# Patient Record
Sex: FEMALE
[De-identification: names, ages, dates, MRNs, and addresses within clinical notes are randomized per-mention and may not be internally consistent; named-entity substitution may affect disease eponyms.]

---

## 2017-03-13 ENCOUNTER — HOSPITAL ENCOUNTER (INPATIENT)
Dept: HOSPITAL 14 - H.ER | Age: 77
LOS: 4 days | Discharge: HOME | DRG: 194 | End: 2017-03-17
Attending: HOSPITALIST | Admitting: HOSPITALIST
Payer: MEDICARE

## 2017-03-13 VITALS — BODY MASS INDEX: 22.8 KG/M2

## 2017-03-13 DIAGNOSIS — E86.9: ICD-10-CM

## 2017-03-13 DIAGNOSIS — I10: ICD-10-CM

## 2017-03-13 DIAGNOSIS — E78.00: ICD-10-CM

## 2017-03-13 DIAGNOSIS — M21.372: ICD-10-CM

## 2017-03-13 DIAGNOSIS — H66.92: ICD-10-CM

## 2017-03-13 DIAGNOSIS — E78.5: ICD-10-CM

## 2017-03-13 DIAGNOSIS — E87.2: ICD-10-CM

## 2017-03-13 DIAGNOSIS — M34.1: ICD-10-CM

## 2017-03-13 DIAGNOSIS — J01.90: ICD-10-CM

## 2017-03-13 DIAGNOSIS — E87.1: ICD-10-CM

## 2017-03-13 DIAGNOSIS — I50.9: ICD-10-CM

## 2017-03-13 DIAGNOSIS — J18.9: Primary | ICD-10-CM

## 2017-03-13 DIAGNOSIS — D64.89: ICD-10-CM

## 2017-03-13 DIAGNOSIS — H40.9: ICD-10-CM

## 2017-03-13 DIAGNOSIS — H91.90: ICD-10-CM

## 2017-03-13 DIAGNOSIS — E87.6: ICD-10-CM

## 2017-03-13 DIAGNOSIS — J84.9: ICD-10-CM

## 2017-03-13 LAB
BASOPHILS # BLD AUTO: 0.1 K/UL (ref 0–0.2)
BASOPHILS NFR BLD: 0.3 % (ref 0–2)
EOSINOPHIL # BLD AUTO: 0 K/UL (ref 0–0.7)
EOSINOPHIL NFR BLD: 0 % (ref 0–4)
ERYTHROCYTE [DISTWIDTH] IN BLOOD BY AUTOMATED COUNT: 13.8 % (ref 11.5–14.5)
HCT VFR BLD CALC: 38 % (ref 34–47)
LYMPHOCYTES # BLD AUTO: 2.8 K/UL (ref 1–4.3)
LYMPHOCYTES NFR BLD AUTO: 10 % (ref 20–40)
MCH RBC QN AUTO: 31 PG (ref 27–31)
MCHC RBC AUTO-ENTMCNC: 32.2 G/DL (ref 33–37)
MCV RBC AUTO: 96.3 FL (ref 81–99)
MONOCYTES # BLD: 1.9 K/UL (ref 0–0.8)
MONOCYTES NFR BLD: 6.7 % (ref 0–10)
NEUTROPHILS # BLD: 23.5 K/UL (ref 1.8–7)
NEUTROPHILS NFR BLD AUTO: 83 % (ref 50–75)
NRBC BLD AUTO-RTO: 0.1 % (ref 0–0)
PLATELET # BLD: 199 K/UL (ref 130–400)
PMV BLD AUTO: 9.1 FL (ref 7.2–11.7)
WBC # BLD AUTO: 28.3 K/UL (ref 4.8–10.8)

## 2017-03-13 NOTE — ED PDOC
HPI: General Adult


Time Seen by Provider: 03/13/17 21:06


Chief Complaint (Nursing): Flu-like Symptoms


Chief Complaint (Provider): Fever, fall


History Per: Patient, Family (son)


Additional Complaint(s): 


Pt. presents with son and states for the past week she's had fever, bodyaches, 

cough. States that 2 days ago while taking a shower she slipped on soap and 

landed on her R side injuring her R hip and R flank area. Pt. was able to get 

up on her own immediately. Denies hematuria, head injury, sick contacts, recent 

travel, N/V/D, melena, hematochezia, BRBPR, neck pain.





Past Medical History


Reviewed: Historical Data, Nursing Documentation, Vital Signs


Vital Signs: 


 Last Vital Signs











Temp  97.4 F L  03/14/17 03:30


 


Pulse  82   03/14/17 03:30


 


Resp  19   03/14/17 03:30


 


BP  94/55 L  03/14/17 03:30


 


Pulse Ox  99   03/13/17 22:02














- Medical History


PMH: Bronchitis, CHF, Gall Bladder Disease (Cholelithiasis), HTN, 

Hypercholesterolemia, Pneumonia


   Denies: Diabetes, HIV, Chronic Kidney Disease





- Family History


Family History: States: No Known Family Hx





- Home Medications


Home Medications: 


 Ambulatory Orders











 Medication  Instructions  Recorded


 


Pentoxifylline [Pentoxil] 400 mg PO TID #0 ter 09/28/15


 


Tramadol Hydrochloride [Tramadol] 50 mg PO DAILY #0 tab 09/28/15


 


traMADol [Ultram] 50 mg PO TID PRN #12 tab 02/02/16














- Allergies


Allergies/Adverse Reactions: 


 Allergies











Allergy/AdvReac Type Severity Reaction Status Date / Time


 


cefadroxil Allergy  RASH Verified 03/13/17 20:40














Review of Systems


ROS Statement: Except As Marked, All Systems Reviewed And Found Negative


Constitutional: Positive for: Fever


Respiratory: Positive for: Cough


Gastrointestinal: Positive for: Abdominal Pain





Physical Exam





- Reviewed


Nursing Documentation Reviewed: Yes


Vital Signs Reviewed: Yes





- Physical Exam


Appears: Positive for: Well, Non-toxic, No Acute Distress


Head Exam: Positive for: ATRAUMATIC, NORMAL INSPECTION, NORMOCEPHALIC


Skin: Positive for: Normal Color, Warm.  Negative for: Rash


Eye Exam: Positive for: EOMI, Normal appearance, PERRL


ENT: Positive for: Normal ENT Inspection


Neck: Positive for: Normal, Painless ROM


Cardiovascular/Chest: Positive for: Regular Rate, Rhythm


Respiratory: Positive for: Normal Breath Sounds.  Negative for: Crackles, Rales


Gastrointestinal/Abdominal: Positive for: Normal Exam, Bowel Sounds, Soft, 

Tenderness (RLQ abdominal tenderness)


Back: Positive for: Normal Inspection, R CVA Tenderness (with large ecchymotic 

area)


Extremity: Positive for: Normal ROM, Other (R lateral hip tenderness without 

pelvic tenderness or deformity).  Negative for: Calf Tenderness


Neurologic/Psych: Positive for: Alert, Oriented





- Laboratory Results


Result Diagrams: 


 03/13/17 21:40





 03/14/17 01:55





- ECG


O2 Sat by Pulse Oximetry: 99





- Radiology


X-Ray: Interpreted by Me (CXR)


X-Ray Interpretation: No Acute Disease (RML infiltrate)





- Progress


ED Course And Treament: 


Levaquin 500mg IV given for pneumonia.


Lactate: 1.8.


Chemisty hemolyzed multiple times therefore CT was done without IV contrast.


CT abd/pelvis w/ PO contrast: MPRESSION:


1. Probable multifocal pneumonia. Followup to resolution to exclude underlying 

pathology.


2. Suspect sacral fracture. Correlate clinically for pain in region.


3. Cholelithiasis





R hip/pelvic x-ray: no fx





IV NS bolus given. 


Case d/w Dr. Hall and informed of lack of IV contrast with CT scan. Arrangements 

made for admission. 





ED OBSERVATION


Date of observation admission: 03/13/17


Time of observation admission: 22:02





- Observation admission statement


Patient is being placed in observation because:: 


Abdominal pain, fall, fever





- Progress Note


Progress Note: 





03/13/17 22:02


Labs ordered. CT abd/pelvis, CT head, CXR, R hip x-ray ordered. 





Disposition





- Clinical Impression


Clinical Impression: 


 Pneumonia, Traumatic ecchymosis of flank





- Patient ED Disposition


Is Patient to be Admitted: Yes





- Disposition


Disposition Time: 02:17


Condition: STABLE

## 2017-03-14 LAB
ALBUMIN/GLOB SERPL: 0.7 {RATIO} (ref 1–2.1)
ALP SERPL-CCNC: 745 U/L (ref 38–126)
ALT SERPL-CCNC: 45 U/L (ref 9–52)
ARTERIAL  BLOOD GAS MODE: (no result)
ARTERIAL PATENCY WRIST A: YES
AST SERPL-CCNC: 58 U/L (ref 14–36)
BASE EXCESS BLDV CALC-SCNC: -13.3 MMOL/L (ref 0–2)
BASOPHILS # BLD AUTO: 0 K/UL (ref 0–0.2)
BASOPHILS NFR BLD: 0.2 % (ref 0–2)
BASOPHILS NFR BLD: 1 % (ref 0–2)
BILIRUB SERPL-MCNC: 5.1 MG/DL (ref 0.2–1.3)
BUN SERPL-MCNC: 21 MG/DL (ref 7–17)
BUN SERPL-MCNC: 24 MG/DL (ref 7–17)
CALCIUM SERPL-MCNC: 7.9 MG/DL (ref 8.4–10.2)
CALCIUM SERPL-MCNC: 7.9 MG/DL (ref 8.4–10.2)
CHLORIDE SERPL-SCNC: 100 MMOL/L (ref 98–107)
CHLORIDE SERPL-SCNC: 99 MMOL/L (ref 98–107)
CO2 SERPL-SCNC: 16 MMOL/L (ref 22–30)
CO2 SERPL-SCNC: 18 MMOL/L (ref 22–30)
EOSINOPHIL # BLD AUTO: 0 K/UL (ref 0–0.7)
EOSINOPHIL NFR BLD: 0 % (ref 0–4)
ERYTHROCYTE [DISTWIDTH] IN BLOOD BY AUTOMATED COUNT: 13.3 % (ref 11.5–14.5)
GLOBULIN SER-MCNC: 3.9 GM/DL (ref 2.2–3.9)
GLUCOSE SERPL-MCNC: 103 MG/DL (ref 65–105)
GLUCOSE SERPL-MCNC: 120 MG/DL (ref 65–105)
HCO3 BLDA-SCNC: 23.8 MMOL/L (ref 21–28)
HCT VFR BLD CALC: 30.1 % (ref 34–47)
LYMPHOCYTES # BLD AUTO: 2.3 K/UL (ref 1–4.3)
LYMPHOCYTES NFR BLD AUTO: 9.2 % (ref 20–40)
MCH RBC QN AUTO: 31.6 PG (ref 27–31)
MCHC RBC AUTO-ENTMCNC: 34 G/DL (ref 33–37)
MCV RBC AUTO: 93.2 FL (ref 81–99)
MONOCYTES # BLD: 1.8 K/UL (ref 0–0.8)
MONOCYTES NFR BLD: 7.4 % (ref 0–10)
NEUTROPHILS # BLD: 20.3 K/UL (ref 1.8–7)
NEUTROPHILS NFR BLD AUTO: 75 % (ref 42–75)
NEUTROPHILS NFR BLD AUTO: 83.2 % (ref 50–75)
NRBC BLD AUTO-RTO: 0 % (ref 0–0)
PCO2 BLDV: 62 MMHG (ref 40–60)
PH BLDA: 7.49 [PH] (ref 7.35–7.45)
PH BLDV: 7.06 [PH] (ref 7.32–7.43)
PLATELET # BLD: 170 K/UL (ref 130–400)
PMV BLD AUTO: 9.4 FL (ref 7.2–11.7)
PO2 BLDA: 86 MM/HG (ref 80–100)
POTASSIUM SERPL-SCNC: 3.3 MMOL/L (ref 3.6–5)
POTASSIUM SERPL-SCNC: 3.4 MMOL/L (ref 3.6–5)
PROT SERPL-MCNC: 6.8 G/DL (ref 6.3–8.2)
SODIUM SERPL-SCNC: 125 MMOL/L (ref 132–148)
SODIUM SERPL-SCNC: 126 MMOL/L (ref 132–148)
TOTAL CELLS COUNTED BLD: 100
VARIANT LYMPHS NFR BLD MANUAL: 4 % (ref 0–0)
VENOUS  BLOOD GAS MODE: (no result)
WBC # BLD AUTO: 24.5 K/UL (ref 4.8–10.8)

## 2017-03-14 RX ADMIN — CIPROFLOXACIN AND DEXAMETHASONE SCH DROP: 3; 1 SUSPENSION/ DROPS AURICULAR (OTIC) at 20:46

## 2017-03-14 RX ADMIN — OLOPATADINE HYDROCHLORIDE SCH DROP: 1 SOLUTION/ DROPS OPHTHALMIC at 16:45

## 2017-03-14 RX ADMIN — ENOXAPARIN SODIUM SCH MG: 40 INJECTION SUBCUTANEOUS at 08:36

## 2017-03-14 NOTE — RAD
HISTORY:

cough  



COMPARISON:

Comparison is made to the previous study dated 09/24/2015



TECHNIQUE:

Chest PA and lateral



FINDINGS:



LUNGS:

Interval appearance of focal hazy opacity at the midportion of the 

right lung since the previous exam. Interval worsening of patchy 

opacities at the peripheral right upper lobe since the previous study.



PLEURA:

No significant pleural effusion identified. No pneumothorax apparent.



CARDIOVASCULAR:

Normal.



OSSEOUS STRUCTURES:

Diffuse osteopenia seen.



VISUALIZED UPPER ABDOMEN:

Normal.



OTHER FINDINGS:

None.



IMPRESSION:

Interval appearance of new opacities at the right mid and upper lung 

since the previous exam.

## 2017-03-14 NOTE — RAD
PROCEDURE:  Right Hip Radiographs.



HISTORY:

trauma  



COMPARISON:

None.



FINDINGS:



BONES:

No evidence of acute fracture or. Diffuse osteopenia noted. 



JOINTS:

Normal. 



SOFT TISSUES:

Normal. 



OTHER FINDINGS:

None.



IMPRESSION:

No evidence of acute fracture or dislocation.  Diffuse osteopenia.

## 2017-03-14 NOTE — CP.PCM.HP
History of Present Illness





- History of Present Illness


History of Present Illness: 


CC: fall, respiratory symptoms





HPI: This is a 77 y/o female with CREST syndrome/Reynauds, HTN, HLD, ?CHF, and 

cholelithiasis who comes in after a fall several days ago in the bathtub after 

slipping on soap. She has been feeling pain on the R side. She did not hit her 

head or have LOC. She also notes some respiratory symptoms and fatigue. 

Respiratory symptoms including cough and congestion started several days ago 

but got worse over past 2 days. Denies f/c/n/v/d. 





PMD: Marina





ROS: 14 systems reviewed, negative other than HPI





MHx: CREST syndrome/Reynauds, HTN, HLD, ?CHF, and cholelithiasis





Allergies: Cefadroxil





Medications: Pending





Family Hx: Reviewed, no findings





Social Hx: Lives at home, no tobacco or EtOH





Surrogate Decision Maker: Melissa, child





Present on Admission





- Present on Admission


Any Indicators Present on Admission: No





Past Patient History





- Infectious Disease


Hx of Infectious Diseases: None





- Tetanus Immunizations


Tetanus Immunization: Unknown





- Past Medical History & Family History


Past Medical History?: Yes





- Past Social History


Smoking Status: Never Smoked





- CARDIAC


Hx Congestive Heart Failure: Yes


Hx Hypercholesterolemia: Yes


Hx Hypertension: Yes





- PULMONARY


Hx Bronchitis: Yes


Hx Pneumonia: Yes





- NEUROLOGICAL


Hx Neurological Disorder: No





- HEENT


Hx HEENT Problems: No





- RENAL


Hx Chronic Kidney Disease: No





- ENDOCRINE/METABOLIC


Hx Endocrine Disorders: No





- HEMATOLOGICAL/ONCOLOGICAL


Hx Human Immunodeficiency Virus (HIV): No





- INTEGUMENTARY


Hx Dermatological Problems: No





- MUSCULOSKELETAL/RHEUMATOLOGICAL


Hx Musculoskeletal Disorders: Yes


Hx Falls: No


Other/Comment: raynauds CREST syndrome; skin graft left foot (12/2013) and 

right posterior leg (2010)





- GASTROINTESTINAL


Hx Gall Bladder Disease: Yes (Cholelithiasis)





- GENITOURINARY/GYNECOLOGICAL


Hx Genitourinary Disorders: No





- PSYCHIATRIC


Hx Psychophysiologic Disorder: No


Hx Substance Use: No





- SURGICAL HISTORY


Hx Surgeries: Yes


Hx Cataract Extraction: Yes


Other/Comment: BLE skin grafts.  Foot Surgery





- ANESTHESIA


Hx Anesthesia: Yes


Hx Anesthesia Reactions: No


Hx Malignant Hyperthermia: No





Meds


Allergies/Adverse Reactions: 


 Allergies











Allergy/AdvReac Type Severity Reaction Status Date / Time


 


cefadroxil Allergy  RASH Verified 03/13/17 20:40














Physical Exam





- Constitutional


Appears: No Acute Distress





- Head Exam


Head Exam: ATRAUMATIC, NORMOCEPHALIC





- Eye Exam


Eye Exam: EOMI, PERRL





- ENT Exam


ENT Exam: Mucous Membranes Moist





- Neck Exam


Neck exam: Positive for: Full Rom





- Respiratory Exam


Respiratory Exam: Rhonchi, NORMAL BREATHING PATTERN





- Cardiovascular Exam


Cardiovascular Exam: REGULAR RHYTHM, +S1, +S2





- GI/Abdominal Exam


GI & Abdominal Exam: Normal Bowel Sounds, Soft





- Extremities Exam


Extremities exam: Positive for: full ROM, normal inspection





- Neurological Exam


Neurological exam: Alert, CN II-XII Intact, Oriented x3





- Psychiatric Exam


Psychiatric exam: Normal Affect, Normal Mood





- Skin


Skin Exam: Dry, Warm





Results





- Vital Signs


Recent Vital Signs: 





 Last Vital Signs











Temp  97.8 F   03/13/17 20:41


 


Pulse  114 H  03/13/17 20:41


 


Resp  14   03/13/17 20:41


 


BP  110/57 L  03/13/17 20:41


 


Pulse Ox  99   03/13/17 22:02














- Labs


Result Diagrams: 


 03/13/17 21:40





 03/14/17 01:55





- Imaging and Cardiology


  ** CT scan - abdomen


Status: Report reviewed by me (No fx, cholecystitis)





Assessment & Plan


(1) CAP (community acquired pneumonia)


Assessment and Plan: 


This is a 77 y/o female with multiple chronic problems presenting with CAP and 

falls. Also with hyponatremia.


-Admit med/surg


-Continue Levaquin 750 IV daily


-Duonebs q6h PRN


-Hyponatremia likely 2/2 vol dep; hydrate overnight and recheck BMP; if not 

improved, further w/u


-Pain mgmt with tylenol and tramadol


-DVT PPx -- Lovenox





Status: Acute   Priority: High





(2) DVT prophylaxis


Status: Acute   Priority: High

## 2017-03-14 NOTE — CT
EXAM:

  CT Head Without Intravenous Contrast.



CLINICAL HISTORY:

  76 years old, female; Injury or trauma; Fall; Initial encounter; Blunt trauma 

(contusions or hematomas)



TECHNIQUE:

  Axial computed tomography images of the head/brain without intravenous 

contrast.  This CT exam was performed using one or more of the following dose 

reduction techniques:  automated exposure control, adjustment of the mA and/or 

kV according to patient size, and/or use of iterative reconstruction technique.

  Coronal and sagittal reformatted images were created and reviewed.



COMPARISON:

  CT - HEAD W/O CONTRAST 10/9/2014 8:14:09 AM



FINDINGS:

  Brain:  Mild atrophy.  No intracranial hemorrhage.  No mass.  Calcifications 

of basal ganglia, deep white matter, cerebellum, stable.  Minimal decreased 

attenuation within periventricular white matter.  No edema.

  Ventricles:  No hydrocephalus.

  Bones/joints:  No acute fracture.

  Soft tissues:  Unremarkable.

  Vasculature:  Minimal atherosclerotic disease of intracranial arteries.

  Sinuses:  Near complete opacification of LEFT maxillary sinus.  

Mild-to-moderate mucosal thickening/fluid of RIGHT maxillary sinus.  

Mild-to-moderate mucosal thickening/fluid of sphenoid sinuses.  Mild mucosal 

thickening/fluid of frontal sinuses.  Moderate to extensive mucosal 

thickening/opacification of ethmoid sinuses.

  Mastoid air cells:  Partial opacification of LEFT mastoid.

  Orbits:  Unremarkable as visualized.



IMPRESSION:     

1.  No intracranial hemorrhage.

2.  Nonspecific white matter changes.

3.  Sinus disease.

4.  Mastoid disease.

5.  Incidental/non-acute findings are described above.

## 2017-03-14 NOTE — CP.PCM.PN
Subjective





- Date & Time of Evaluation


Date of Evaluation: 03/14/17


Time of Evaluation: 10:00





- Subjective


Subjective: 


Patient seen and examined bedside . Daughter present . Chart reviewed and care 

resumed.75 y/o female patient with PMH Crest/ Raynaud syndrome, HTN, 

Cholelithiasis , hard on hearing, chronic left foot drop9 uses brace) brought 

in by her daughter for evaluation since daughter noticed she could not hear 

well (less than her baseline) and because she had a fall at home few days ago 

in the shower. She has been feeling pain on the R side. She did not hit her 

head or have LOC. She also notes some respiratory symptoms and fatigue. 

Respiratory symptoms including cough and congestion started several days ago 

but got worse over past 2 days. Denies f/c/n/v/d. 


Patient complaining of pain to left ear and has some yellowish drainage from 

left ear 


CXR showed possible multifocal pneumonia, patient afebrile WBC elevated 28 K 


Ct pelvis showed suspected sacral fracture but patient has no tenderness to the 

area and does not complain of pain.





Objective





- Vital Signs/Intake and Output


Vital Signs (last 24 hours): 


 











Temp Pulse Resp BP Pulse Ox


 


 98.3 F   59 L  18   95/56 L  96 


 


 03/14/17 08:09  03/14/17 08:09  03/14/17 08:09  03/14/17 08:09  03/14/17 08:09











- Medications


Medications: 


 Current Medications





Acetaminophen (Tylenol 325mg Tab)  650 mg PO Q6 PRN


   PRN Reason: Pain, Mild (1-3)


Acetaminophen (Tylenol 325mg Tab)  650 mg PO Q6 PRN


   PRN Reason: Fever >100.4 F


Albuterol/Ipratropium (Duoneb 3 Mg/0.5 Mg (3 Ml) Ud)  3 ml INH RQ6 PRN


   PRN Reason: Shortness of Breath


Enoxaparin Sodium (Lovenox)  40 mg SC DAILY MARY


   PRN Reason: Protocol


   Last Admin: 03/14/17 08:36 Dose:  40 mg


Home Med (Patient's Own Medication)  1 unit OU DAILY MARY


Home Med (Patient's Own Medication)  1 unit OU DAILY MARY


Levofloxacin/Dextrose (Levaquin 750mg)  150 mls @ 100 mls/hr IVPB DAILY MARY


Sodium Chloride (Sodium Chloride 0.9%)  1,000 mls @ 100 mls/hr IV .Q10H [facility]


   Stop: 03/14/17 12:29


   Last Admin: 03/14/17 04:03 Dose:  100 mls/hr


Ondansetron HCl (Zofran Inj)  4 mg IVP Q6 PRN


   PRN Reason: Nausea/Vomiting


Pentoxifylline (Pentoxil)  400 mg PO TID [facility]


   Last Admin: 03/14/17 08:35 Dose:  400 mg


Tramadol HCl (Ultram)  50 mg PO Q4 [facility]











- Labs


Labs: 


 





 03/14/17 05:30 





 03/14/17 05:30 





 











PT  12.6 SECONDS (9.6-11.2)  H  03/14/17  01:55    


 


INR  1.21  (0.92-1.08)  H  03/14/17  01:55    


 


APTT   SECONDS (23.3-32.5)   03/14/17  01:55    














- Constitutional


Appears: Non-toxic, No Acute Distress, Other (hard on hearing )





- Head Exam


Head Exam: ATRAUMATIC, NORMAL INSPECTION, NORMOCEPHALIC





- Eye Exam


Eye Exam: EOMI, Normal appearance, PERRL


Pupil Exam: NORMAL ACCOMODATION





- ENT Exam


ENT Exam: Mucous Membranes Moist, Normal Exam





- Neck Exam


Neck Exam: Full ROM, Normal Inspection





- Respiratory Exam


Respiratory Exam: Rales (bilateral), Rhonchi (scattered to the right), NORMAL 

BREATHING PATTERN.  absent: Accessory Muscle Use, Prolonged Expiratory Phase, 

Respiratory Distress





- Cardiovascular Exam


Cardiovascular Exam: REGULAR RHYTHM, RRR, +S1, +S2.  absent: JVD





- GI/Abdominal Exam


GI & Abdominal Exam: Soft, Normal Bowel Sounds.  absent: Guarding, Tenderness, 

Rebound





- Rectal Exam


Rectal Exam: Deferred





- Extremities Exam


Extremities Exam: Full ROM, Normal Capillary Refill, Normal Inspection.  absent

: Pedal Edema


Additional comments: 


left foot drop





- Back Exam


Back Exam: NORMAL INSPECTION





- Neurological Exam


Neurological Exam: Alert, Awake, CN II-XII Intact





- Psychiatric Exam


Psychiatric exam: Normal Affect





- Skin


Skin Exam: Dry, Normal Color, Warm


Additional comments: 


right flank echymotic area 


right superior to pelvic rim echymotic area





Assessment and Plan





- Assessment and Plan (Free Text)


Assessment: 


75 y/o female with CREST syndrome/Reynauds, HTN, cholelithiasis and left foot 

drop came in for evaluation after a fall several days ago in the bathtub after 

slipping on soap. She has been feeling pain on the R side. She did not hit her 

head or have LOC. She also notes some respiratory symptoms and fatigue. 

Respiratory symptoms including cough and congestion started several days ago 

but got worse over past 2 days. Denies f/c/n/v/d. 





1. Fall 


complaining of right flank pain 


Ct pelvis showed suspected sacral fracture. Given her clinical presentation 

sacral fracture is unlikely 


Ct head showed no acute pathology


No tenderness to sacral area with palpation, no hematoma  or pain with movement 


Start physical therapy as tolerated , full weight bearing to LE


Asked family to bring brace to E since patient has chronic left foot drop ,

for PT training


pain management PRN. Avoid narcotics due to low BP and her age


SW eval for home discharge planning. Family would like home physical therapy 

and also will nee home health services 








2.CAP (community acquired pneumonia)


CXR showed multifocal pneumonia to the right hemithorax ( patient has history 

of interstitial lung disease)


WBC elevated to 28k---24 k but patient afebrile


Continue Levaquiine IV








3. Left  otitis externa


start Cipro ottic ear drops


continue levaquine


 


4.Hyponatremia/non anion gap metabolic acidosis


Most likely secondary to volume depletion


Continue IVF





5. Hearing loss


As per daughter patient developed hearing loss after was treated with 

antibiotics for sepsis years ago 


Worsening hearing loss most likely related to age and external otitis


Will treat for otitis externa


will need evaluation with audiogram as out patient 





6. Mild anemia


Most likely dilutional








7. Glaucoma


resume eye drops








8.Interstitial lung disease/ crest syndrome


on Pentoxyfilline








9 DVT prophylaxis


lovenox

## 2017-03-14 NOTE — CT
EXAM:

  CT Abdomen and Pelvis With Intravenous Contrast.



CLINICAL HISTORY:

  76 years old, female; Pain; Abdominal pain; Flank; Right; Additional info: 

Fall, abd pain



TECHNIQUE:

  Axial computed tomography images of the abdomen and pelvis with intravenous 

contrast.  This CT exam was performed using one or more of the following dose 

reduction techniques:  automated exposure control, adjustment of the mA and/or 

kV according to patient size, and/or use of iterative reconstruction technique.

  Coronal and sagittal reformatted images were created and reviewed.



COMPARISON:

  No relevant prior studies available.



FINDINGS:

  Limitations:  Lack of intravenous contrast.  Motion artifact - mild.

  Lower thorax:  Minimal atelectasis.  Mild patchy and confluent airspace 

opacities RIGHT middle, LEFT lower lobes.



 ABDOMEN:

  Liver:  Unremarkable.  No mass.

  Gallbladder and bile ducts:  Calcified gallstones.  No ductal dilation.

  Pancreas:  No ductal dilation.  No mass.

  Spleen:  No splenomegaly.

  Adrenals:  No mass.

  Kidneys and ureters:  No renal calculi.

  Stomach and bowel:  No definite mural thickening.  No obstruction.

  Appendix:  No findings to suggest acute appendicitis.



 PELVIS:

  Bladder:  Unremarkable.

  Reproductive:  Unremarkable as visualized.



 ABDOMEN and PELVIS:

  Intraperitoneal space:  No significant fluid collection.  No free air.

  Bones/joints:  Acute angulation anterior cortex distal sacrum.

  Soft tissues:  Unremarkable.

  Vasculature:  Unremarkable.  No abdominal aortic aneurysm.

  Lymph nodes:  No pathologically enlarged lymph nodes.



IMPRESSION:     

1.  Probable multifocal pneumonia. Followup to resolution to exclude underlying 

pathology.

2.  Suspect sacral fracture.  Correlate clinically for pain in region.

3.  Cholelithiasis.

4.  Incidental/non-acute findings are described above.

## 2017-03-15 LAB
BUN SERPL-MCNC: 19 MG/DL (ref 7–17)
CALCIUM SERPL-MCNC: 8.7 MG/DL (ref 8.4–10.2)
CHLORIDE SERPL-SCNC: 107 MMOL/L (ref 98–107)
CO2 SERPL-SCNC: 20 MMOL/L (ref 22–30)
ERYTHROCYTE [DISTWIDTH] IN BLOOD BY AUTOMATED COUNT: 13.3 % (ref 11.5–14.5)
GLUCOSE SERPL-MCNC: 86 MG/DL (ref 65–105)
HCT VFR BLD CALC: 32 % (ref 34–47)
MCH RBC QN AUTO: 31.9 PG (ref 27–31)
MCHC RBC AUTO-ENTMCNC: 34 G/DL (ref 33–37)
MCV RBC AUTO: 94.1 FL (ref 81–99)
PLATELET # BLD: 169 K/UL (ref 130–400)
POTASSIUM SERPL-SCNC: 3.8 MMOL/L (ref 3.6–5)
SODIUM SERPL-SCNC: 141 MMOL/L (ref 132–148)
WBC # BLD AUTO: 13.3 K/UL (ref 4.8–10.8)

## 2017-03-15 RX ADMIN — ENOXAPARIN SODIUM SCH MG: 40 INJECTION SUBCUTANEOUS at 09:49

## 2017-03-15 RX ADMIN — CIPROFLOXACIN AND DEXAMETHASONE SCH DROP: 3; 1 SUSPENSION/ DROPS AURICULAR (OTIC) at 09:49

## 2017-03-15 RX ADMIN — OLOPATADINE HYDROCHLORIDE SCH DROP: 1 SOLUTION/ DROPS OPHTHALMIC at 09:49

## 2017-03-15 RX ADMIN — CIPROFLOXACIN AND DEXAMETHASONE SCH DROP: 3; 1 SUSPENSION/ DROPS AURICULAR (OTIC) at 18:49

## 2017-03-15 NOTE — CP.PCM.PN
Subjective





- Date & Time of Evaluation


Date of Evaluation: 03/15/17


Time of Evaluation: 09:45





- Subjective


Subjective: 


No fever


ear pain better


minimal cough


no SOB


no CP


no abd pain


no N/V








Objective





- Vital Signs/Intake and Output


Vital Signs (last 24 hours): 


 











Temp Pulse Resp BP Pulse Ox


 


 99.3 F   75   28 H  125/72   97 


 


 03/15/17 08:28  03/15/17 08:28  03/15/17 08:28  03/15/17 08:28  03/15/17 08:28











- Medications


Medications: 


 Current Medications





Acetaminophen (Tylenol 325mg Tab)  650 mg PO Q6 PRN


   PRN Reason: Pain, Mild (1-3)


   Last Admin: 03/14/17 15:22 Dose:  650 mg


Acetaminophen (Tylenol 325mg Tab)  650 mg PO Q6 PRN


   PRN Reason: Fever >100.4 F


Albuterol/Ipratropium (Duoneb 3 Mg/0.5 Mg (3 Ml) Ud)  3 ml INH RQ6 PRN


   PRN Reason: Shortness of Breath


Ciprofloxacin/Dexamethasone (Ciprodex Otic)  4 drop AD BID [facility]


   Last Admin: 03/15/17 09:49 Dose:  4 drop


Enoxaparin Sodium (Lovenox)  40 mg SC DAILY [facility]


   PRN Reason: Protocol


   Last Admin: 03/15/17 09:49 Dose:  40 mg


Home Med (Patient's Own Medication)  1 unit OU DAILY [facility]


   Last Admin: 03/15/17 09:50 Dose:  1 unit


Levofloxacin/Dextrose (Levaquin 750mg)  150 mls @ 100 mls/hr IVPB DAILY [facility]


   Last Admin: 03/15/17 09:48 Dose:  100 mls/hr


Sodium Chloride (Sodium Chloride 0.9%)  1,000 mls @ 100 mls/hr IV .Q10H [facility]


   Stop: 03/15/17 18:46


   Last Admin: 03/15/17 05:17 Dose:  100 mls/hr


Olopatadine HCl (Patanol 0.1% Opht Soln)  1 drop OU DAILY [facility]


   Last Admin: 03/15/17 09:49 Dose:  1 drop


Ondansetron HCl (Zofran Inj)  4 mg IVP Q6 PRN


   PRN Reason: Nausea/Vomiting


   Last Admin: 03/14/17 13:20 Dose:  4 mg


Pentoxifylline (Pentoxil)  400 mg PO TID [facility]


   Last Admin: 03/15/17 09:48 Dose:  400 mg


Tramadol HCl (Ultram)  50 mg PO Q4 PRN


   PRN Reason: Pain, severe (8-10)











- Labs


Labs: 


 





 03/15/17 06:15 





 03/15/17 06:15 





 











PT  12.6 SECONDS (9.6-11.2)  H  03/14/17  01:55    


 


INR  1.21  (0.92-1.08)  H  03/14/17  01:55    


 


APTT   SECONDS (23.3-32.5)   03/14/17  01:55    














- Constitutional


Appears: No Acute Distress, Chronically Ill





- Head Exam


Head Exam: NORMAL INSPECTION, NORMOCEPHALIC





- Eye Exam


Eye Exam: EOMI, Normal appearance


Pupil Exam: NORMAL ACCOMODATION





- ENT Exam


ENT Exam: Mucous Membranes Dry, Normal External Ear Exam





- Neck Exam


Neck Exam: Full ROM.  absent: Meningismus





- Respiratory Exam


Respiratory Exam: Rales (minimal rales bases), Rhonchi, NORMAL BREATHING 

PATTERN.  absent: Respiratory Distress





- Cardiovascular Exam


Cardiovascular Exam: REGULAR RHYTHM, +S1, +S2





- GI/Abdominal Exam


GI & Abdominal Exam: Soft, Normal Bowel Sounds.  absent: Tenderness





- Extremities Exam


Extremities Exam: Normal Capillary Refill.  absent: Calf Tenderness, Pedal Edema





- Back Exam


Back Exam: absent: CVA tenderness (L), CVA tenderness (R), paraspinal tenderness

, vertebral tenderness





- Neurological Exam


Neurological Exam: Alert, Awake, Oriented x3


Additional comments: 


moves all extremities





- Psychiatric Exam


Psychiatric exam: Normal Affect, Normal Mood





- Skin


Skin Exam: Dry, Normal Color, Warm





Assessment and Plan


(1) CAP (community acquired pneumonia)


Status: Acute





(2) Otitis media


Status: Acute





(3) Sinusitis, acute


Status: Acute





(4) Mastoiditis


Status: Acute





(5) CREST (calcinosis, Raynaud's phenomenon, esophageal dysfunction, 

sclerodactyly, telangiectasia)


Status: Chronic





(6) ILD (interstitial lung disease)


Status: Chronic





(7) DVT prophylaxis


Status: Acute








- Assessment and Plan (Free Text)


Assessment: 


75 y/o female with CREST syndrome/Reynauds, HTN, cholelithiasis and left foot 

drop came in for evaluation after a fall several days ago in the bathtub after 

slipping on soap. She has been feeling pain on the R side. She did not hit her 

head or have LOC. She also notes some respiratory symptoms and fatigue. 

Respiratory symptoms including cough and congestion started several days ago 

but got worse over past 2 days. Denies f/c/n/v/d. 





1. Fall 


complaining of right flank pain 


Ct pelvis showed suspected sacral fracture. Given her clinical presentation 

sacral fracture is unlikely 


Ct head showed no acute pathology


No tenderness to sacral area with palpation, no hematoma  or pain with movement 


Start physical therapy as tolerated , full weight bearing to LE


Asked family to bring brace to E since patient has chronic left foot drop ,

for PT training


pain management PRN.  Avoid narcotics due to low BP and her age


Plan for TCU for PT and IV abx








2.CAP (community acquired pneumonia)


CXR showed multifocal pneumonia to the right hemithorax ( patient has history 

of interstitial lung disease)


WBC elevated to 28k now down to 13K


Continue Levaquin IV








3. Acute Sinusitis/Otitis /Mastoiditis


pt complains of left ear pain and hearing loss


cont Cipro otic ear drops


continue levaquin IV


ENT eval as outpt


 


4.Hyponatremia/non anion gap metabolic acidosis, improving


Most likely secondary to volume depletion


Continue IVF





5. Hearing loss


As per daughter patient developed hearing loss after was treated with 

antibiotics for sepsis years ago 


Worsening hearing loss most likely related to age and  otitis


will need evaluation with audiogram and ENT  as out patient 





6. Mild anemia


Most likely dilutional








7. Glaucoma


resume eye drops








8.Interstitial lung disease/ Crest syndrome


on Pentoxyfilline








9 DVT prophylaxis


lovenox

## 2017-03-16 LAB
BASOPHILS # BLD AUTO: 0 K/UL (ref 0–0.2)
BASOPHILS NFR BLD: 0.3 % (ref 0–2)
BUN SERPL-MCNC: 12 MG/DL (ref 7–17)
CALCIUM SERPL-MCNC: 8.7 MG/DL (ref 8.4–10.2)
CHLORIDE SERPL-SCNC: 105 MMOL/L (ref 98–107)
CO2 SERPL-SCNC: 22 MMOL/L (ref 22–30)
EOSINOPHIL # BLD AUTO: 0 K/UL (ref 0–0.7)
EOSINOPHIL NFR BLD: 0.1 % (ref 0–4)
ERYTHROCYTE [DISTWIDTH] IN BLOOD BY AUTOMATED COUNT: 13 % (ref 11.5–14.5)
GLUCOSE SERPL-MCNC: 91 MG/DL (ref 65–105)
HCT VFR BLD CALC: 32 % (ref 34–47)
LYMPHOCYTES # BLD AUTO: 1.9 K/UL (ref 1–4.3)
LYMPHOCYTES NFR BLD AUTO: 13.6 % (ref 20–40)
MCH RBC QN AUTO: 31.3 PG (ref 27–31)
MCHC RBC AUTO-ENTMCNC: 33.7 G/DL (ref 33–37)
MCV RBC AUTO: 92.7 FL (ref 81–99)
MONOCYTES # BLD: 1.3 K/UL (ref 0–0.8)
MONOCYTES NFR BLD: 9 % (ref 0–10)
NEUTROPHILS # BLD: 10.9 K/UL (ref 1.8–7)
NEUTROPHILS NFR BLD AUTO: 77 % (ref 50–75)
NRBC BLD AUTO-RTO: 0.1 % (ref 0–0)
PLATELET # BLD: 248 K/UL (ref 130–400)
PMV BLD AUTO: 8.9 FL (ref 7.2–11.7)
POTASSIUM SERPL-SCNC: 3.3 MMOL/L (ref 3.6–5)
SODIUM SERPL-SCNC: 138 MMOL/L (ref 132–148)
WBC # BLD AUTO: 14.2 K/UL (ref 4.8–10.8)

## 2017-03-16 RX ADMIN — CIPROFLOXACIN AND DEXAMETHASONE SCH DROP: 3; 1 SUSPENSION/ DROPS AURICULAR (OTIC) at 09:13

## 2017-03-16 RX ADMIN — Medication SCH MG: at 21:18

## 2017-03-16 RX ADMIN — CIPROFLOXACIN AND DEXAMETHASONE SCH DROP: 3; 1 SUSPENSION/ DROPS AURICULAR (OTIC) at 16:46

## 2017-03-16 RX ADMIN — ENOXAPARIN SODIUM SCH MG: 40 INJECTION SUBCUTANEOUS at 09:11

## 2017-03-16 RX ADMIN — OLOPATADINE HYDROCHLORIDE SCH DROP: 1 SOLUTION/ DROPS OPHTHALMIC at 09:12

## 2017-03-16 NOTE — CP.PCM.PN
Subjective





- Date & Time of Evaluation


Date of Evaluation: 03/16/17


Time of Evaluation: 13:15





- Subjective


Subjective: 


Hospitalist Progress Note (Patient was seen and examined at 1:15 PM 3/16/17 in 

Room 662-2)





76 year old female who was admitted on 3/14/17 a few days after a fall in the 

bathtub (NO LOC) and for complaints of cough/congestion. CT Abdomen Pelvis 3/13/

17 with suspected Sacral Fx. CT Head 3/13/17 with mild to moderate to severe 

opacification of the sinuses and left mastoid partial opacification. Chest X 

Ray wtih new opacities in the right mid/upper lung. She was started on Levaquin 

for the Right Pneumonia, Sinusitis, and Left Mastoiditis. Consults with ID Dr. Briones and ENT Dr. Behin were requested for further recommendations. 





Currently upon FULL ROS NO chest pain, NO palpitations, (+) Cough that comes 

and goes nonproductive, NO SOB/Wheezing, NO dysphagia/odynophagia, NO abdominal 

pain, NO n/v/d/c (did not eat her lunch much as she did not like what was 

brought for her), NO burning/pain with urination, NO headache, NO burning/pain 

with urination, NO new changes in vision/eye pain, NO current pain in the left 

ear, NO edema





HEENT: NCA, EOMI, PERRLA, Pharynx is without erythema/exudate, Oral Mucosa and 

Nasal Turbinates are moist, NO cervical/periauricular lymphadenopathy, NO 

thyromegaly


Cardio: NS1 and NS2, NO M/R/G


Respiratory: Bilateral Basilar Inspiratory Rales


GI: BS x 4, Soft, NT, ND, NO hepatomegaly/splenomegaly, NO guarding/rebound 

tenderness


Ext: NO edema, Pulses are strong and equal, Capillary Refill is 2 seconds


Neuro: CN II through XII are grossly intact





Assessment and Plan:





1). S/P Fall


PT


Hx Chronic Left Foot Drop: family to bring in brace


Avoiding narcotics as low blood pressure





2). Right Lung Pneumonia


Levaquin 750 mg IV 1x/day





3). Left Otitis/Mastoiditis and Sinusitis


Levaquin 750 mg IV 1x/day


Vancomycin 1 gm IV 1x/day


Cipro Otic 4 drops to Left Ear 4x/day


ID Dr. Briones and ENT Dr. Behin for obtaining possible culture from Left Ear 

and further recommendations: Length of Antibiotic?





4). Hx Hyponatremia/Non-Anion Gap Acidosis


Resolved





5). Hypokalemia


Kdur 40 mEQ PO x 1 dose 3/16/17


F/U Mag, Phos, BMP 3/17/17





6). Hx Anemia


Monitor HgB/Hct which is stable





7). Hx of Hearing Loss S/P Tx for Sepsis 1 year ago


Will need outpatient Audiogram





8). Hx Gluacoma


Lumigan 1 drop bilateral eyes


Olopatadine 0.1% 1 drop bilateral eyes





9). Hx CREST/Raynaud's


Pentoxil 400 mg PO TID





10). Prophylaxis


Zofran 4 mg IV Q6H PRN N/V


Tramadol 50mg PO Q4H PRN Severe Pain


Tylenol 650 mg PO Q6H PRN Mild Pain


Tylenol 650 mg PO Q6H PRN Fever


Duoneb Q6H PRN SOB


Lovenox 40 mg SQ 1x/day


Protonix 40 mg PO 1x/day


Florastor 250 mg PO 2x/day


Ensure PO 3x/day





Den Brasher D.O.





Objective





- Vital Signs/Intake and Output


Vital Signs (last 24 hours): 


 











Temp Pulse Resp BP Pulse Ox


 


 98.5 F   89   18   117/66   99 


 


 03/16/17 16:10  03/16/17 16:10  03/16/17 16:10  03/16/17 16:10  03/16/17 16:10








Intake and Output: 


 











 03/16/17 03/16/17





 06:59 18:59


 


Intake Total  415


 


Balance  415














- Medications


Medications: 


 Current Medications





Acetaminophen (Tylenol 325mg Tab)  650 mg PO Q6 PRN


   PRN Reason: Pain, Mild (1-3)


   Last Admin: 03/14/17 15:22 Dose:  650 mg


Acetaminophen (Tylenol 325mg Tab)  650 mg PO Q6 PRN


   PRN Reason: Fever >100.4 F


Albuterol/Ipratropium (Duoneb 3 Mg/0.5 Mg (3 Ml) Ud)  3 ml INH RQ6 PRN


   PRN Reason: Shortness of Breath


Ciprofloxacin/Dexamethasone (Ciprodex Otic)  4 drop AD BID [facility]


   Last Admin: 03/16/17 16:46 Dose:  4 drop


Enoxaparin Sodium (Lovenox)  40 mg SC DAILY [facility]


   PRN Reason: Protocol


   Last Admin: 03/16/17 09:11 Dose:  40 mg


Home Med (Patient's Own Medication)  1 unit OU DAILY [facility]


   Last Admin: 03/16/17 09:11 Dose:  1 unit


Levofloxacin/Dextrose (Levaquin 750mg)  150 mls @ 100 mls/hr IVPB DAILY [facility]


   Last Admin: 03/16/17 09:12 Dose:  100 mls/hr


Vancomycin HCl 1 gm/ Sodium (Chloride)  250 mls @ 166.667 mls/hr IVPB DAILY [facility]


Olopatadine HCl (Patanol 0.1% Opht Soln)  1 drop OU DAILY [facility]


   Last Admin: 03/16/17 09:12 Dose:  1 drop


Ondansetron HCl (Zofran Inj)  4 mg IVP Q6 PRN


   PRN Reason: Nausea/Vomiting


   Last Admin: 03/14/17 13:20 Dose:  4 mg


Pentoxifylline (Pentoxil)  400 mg PO TID [facility]


   Last Admin: 03/16/17 16:49 Dose:  400 mg


Tramadol HCl (Ultram)  50 mg PO Q4 PRN


   PRN Reason: Pain, severe (8-10)











- Labs


Labs: 


 





 03/16/17 05:55 





 03/16/17 05:55 





 











PT  12.6 SECONDS (9.6-11.2)  H  03/14/17  01:55    


 


INR  1.21  (0.92-1.08)  H  03/14/17  01:55    


 


APTT   SECONDS (23.3-32.5)   03/14/17  01:55

## 2017-03-16 NOTE — CP.PCM.CON
History of Present Illness





- History of Present Illness


History of Present Illness: 


Infectious Disease Consultation Note-








asked to see this patient at the request of the hospitalist.





HPI-


History obtained mostly from the medical chart and the hospitalist.


Pt. is a 77 y/o female with pmh of crest syndrome/Reynauds , HTN, 

cholelithiasis who was admitted for pain in her right side secondary to fall in 

the bathtub few days ago, she didn't have any LOC .


She also had noted some cough and congestion and was found to have ? pneumonia 

on her CXR and also found to have sinusitis and mastoidits on the CT report.


She does explain that she ahs had some ear pain at home but denies having been 

on any antibiotics for this at home.


Pt. has already been started on Odette bx by the primary team for the CAp and otic 

abx for the otitis and mastoiditis and i'm being asked to evaluate and help 

with abx management.








MHx: CREST syndrome/Reynauds, HTN, HLD, ?CHF, and cholelithiasis





Allergies: Cefadroxil








Social Hx: Lives at home, no tobacco or EtOH














Review of Systems





- Review of Systems


Review of Systems: 


ROS-


denies any fever or chills, denies any HA, does states that had some b/l ear 

pain past few days but not at this time, denies any discahrge from her ears, 

denies any facial pain, mild cough , denies any sob, denies any chest pain, 

denies any n/v, denies any abd. pain. denies any dysurea,





Past Patient History





- Infectious Disease


Hx of Infectious Diseases: None





- Tetanus Immunizations


Tetanus Immunization: Unknown





- Past Medical History & Family History


Past Medical History?: Yes





- Past Social History


Smoking Status: Never Smoked


Alcohol: None


Drugs: Denies





- CARDIAC


Hx Congestive Heart Failure: Yes


Hx Hypercholesterolemia: Yes


Hx Hypertension: Yes





- PULMONARY


Hx Bronchitis: Yes


Hx Pneumonia: Yes





- NEUROLOGICAL


Hx Neurological Disorder: No





- HEENT


Hx HEENT Problems: No





- RENAL


Hx Chronic Kidney Disease: No





- ENDOCRINE/METABOLIC


Hx Endocrine Disorders: No





- INTEGUMENTARY


Hx Dermatological Problems: No





- MUSCULOSKELETAL/RHEUMATOLOGICAL


Hx Musculoskeletal Disorders: Yes


Hx Falls: No


Other/Comment: raynauds CREST syndrome; skin graft left foot (2013) and 

right posterior leg ()





- GASTROINTESTINAL


Hx Gall Bladder Disease: Yes (Cholelithiasis)





- GENITOURINARY/GYNECOLOGICAL


Hx Genitourinary Disorders: No





- PSYCHIATRIC


Hx Psychophysiologic Disorder: No


Hx Substance Use: No





- SURGICAL HISTORY


Hx Surgeries: Yes


Hx Cataract Extraction: Yes


Other/Comment: BLE skin grafts.  Foot Surgery





- ANESTHESIA


Hx Anesthesia: Yes


Hx Anesthesia Reactions: No


Hx Malignant Hyperthermia: No





Meds


Allergies/Adverse Reactions: 


 Allergies











Allergy/AdvReac Type Severity Reaction Status Date / Time


 


cefadroxil Allergy  RASH Verified 17 20:40














- Medications


Medications: 


 Current Medications





Acetaminophen (Tylenol 325mg Tab)  650 mg PO Q6 PRN


   PRN Reason: Pain, Mild (1-3)


   Last Admin: 17 15:22 Dose:  650 mg


Acetaminophen (Tylenol 325mg Tab)  650 mg PO Q6 PRN


   PRN Reason: Fever >100.4 F


Albuterol/Ipratropium (Duoneb 3 Mg/0.5 Mg (3 Ml) Ud)  3 ml INH RQ6 PRN


   PRN Reason: Shortness of Breath


Ciprofloxacin/Dexamethasone (Ciprodex Otic)  4 drop AD BID [facility]


   Last Admin: 17 09:13 Dose:  4 drop


Enoxaparin Sodium (Lovenox)  40 mg SC DAILY [facility]


   PRN Reason: Protocol


   Last Admin: 17 09:11 Dose:  40 mg


Home Med (Patient's Own Medication)  1 unit OU DAILY [facility]


   Last Admin: 17 09:11 Dose:  1 unit


Levofloxacin/Dextrose (Levaquin 750mg)  150 mls @ 100 mls/hr IVPB DAILY [facility]


   Last Admin: 17 09:12 Dose:  100 mls/hr


Olopatadine HCl (Patanol 0.1% Opht Soln)  1 drop OU DAILY [facility]


   Last Admin: 17 09:12 Dose:  1 drop


Ondansetron HCl (Zofran Inj)  4 mg IVP Q6 PRN


   PRN Reason: Nausea/Vomiting


   Last Admin: 17 13:20 Dose:  4 mg


Pentoxifylline (Pentoxil)  400 mg PO TID [facility]


   Last Admin: 17 09:10 Dose:  400 mg


Tramadol HCl (Ultram)  50 mg PO Q4 PRN


   PRN Reason: Pain, severe (8-10)











Physical Exam





- Constitutional


Appears: Non-toxic, No Acute Distress





- Head Exam


Head Exam: ATRAUMATIC





- Eye Exam


Eye Exam: EOMI, PERRL





- ENT Exam


ENT Exam: Normal Oropharynx


Additional comments: 


no tenderness around the ears, no discharge from the ears





- Neck Exam


Neck exam: Positive for: Full Rom





- Respiratory Exam


Respiratory Exam: NORMAL BREATHING PATTERN


Additional comments: 


scattered rhonchi


, minimal exp wheezing





- Cardiovascular Exam


Cardiovascular Exam: RRR, +S1, +S2





- GI/Abdominal Exam


GI & Abdominal Exam: Normal Bowel Sounds, Soft


Additional comments: 


NT, ND





- Extremities Exam


Additional comments: 


no edema B/L LE





- Neurological Exam


Neurological exam: Alert, Oriented x3





Results





- Vital Signs


Recent Vital Signs: 


 Last Vital Signs











Temp  98.7 F   17 07:56


 


Pulse  109 H  17 07:56


 


Resp  20   17 07:56


 


BP  119/63   17 07:56


 


Pulse Ox  94 L  17 07:56














- Labs


Result Diagrams: 


 17 05:55





 17 05:55


Labs: 


 Laboratory Results - last 24 hr











  17





  05:55


 


WBC  14.2 H


 


RBC  3.45 L


 


Hgb  10.8 L


 


Hct  32.0 L


 


MCV  92.7


 


MCH  31.3 H


 


MCHC  33.7


 


RDW  13.0


 


Plt Count  248


 


MPV  8.9


 


Neut % (Auto)  77.0 H


 


Lymph % (Auto)  13.6 L


 


Mono % (Auto)  9.0


 


Eos % (Auto)  0.1


 


Baso % (Auto)  0.3


 


Neut #  10.9 H


 


Lymph #  1.9


 


Mono #  1.3 H


 


Eos #  0.0


 


Baso #  0.0


 


Sodium  138


 


Potassium  3.3 L


 


Chloride  105


 


Carbon Dioxide  22


 


Anion Gap  14


 


BUN  12


 


Creatinine  0.6 L


 


Est GFR ( Amer)  > 60


 


Est GFR (Non-Af Amer)  > 60


 


Random Glucose  91


 


Calcium  8.7








 Laboratory Results - last 72 hr











  17





  00:24 21:33 21:40


 


WBC    28.3 H D


 


RBC    3.94


 


Hgb    12.2


 


Hct    38.0


 


MCV    96.3


 


MCH    31.0


 


MCHC    32.2 L


 


RDW    13.8


 


Plt Count    199


 


MPV    9.1


 


Neut % (Auto)    83.0 H


 


Lymph % (Auto)    10.0 L


 


Mono % (Auto)    6.7


 


Eos % (Auto)    0.0


 


Baso % (Auto)    0.3


 


Neut #    23.5 H


 


Lymph #    2.8


 


Mono #    1.9 H


 


Eos #    0.0


 


Baso #    0.1


 


Neutrophils % (Manual)   


 


Band Neutrophils %   


 


Lymphocytes % (Manual)   


 


Reactive Lymphs %   


 


Monocytes % (Manual)   


 


Basophils % (Manual)   


 


Toxic Granulation   


 


Platelet Estimate   


 


Hypochromasia (manual)   


 


PT   


 


INR   


 


APTT   


 


pCO2   


 


pO2   


 


HCO3   


 


ABG pH   


 


ABG Total CO2   


 


ABG O2 Saturation   


 


ABG Base Excess   


 


Saud Test   


 


ABG Potassium   


 


VBG pH   


 


VBG pCO2   


 


VBG HCO3   


 


VBG Total CO2   


 


VBG O2 Sat (Calc)   


 


VBG Base Excess   


 


A-a O2 Difference   


 


Sodium   


 


Chloride   


 


Glucose   


 


Lactate   


 


Vent Mode   


 


FiO2   


 


Crit Value Called To   


 


Crit Value Called By   


 


Crit Value Read Back   


 


Blood Gas Notified Time   


 


Potassium   


 


Carbon Dioxide   


 


Anion Gap   


 


BUN   


 


Creatinine   


 


Est GFR ( Amer)   


 


Est GFR (Non-Af Amer)   


 


Random Glucose   


 


Calcium   


 


Total Bilirubin   


 


AST   


 


ALT   


 


Alkaline Phosphatase   


 


Total Protein   


 


Albumin   


 


Globulin   


 


Albumin/Globulin Ratio   


 


Arterial Blood Potassium   


 


Influenza Typ A,B (EIA)   Negative for flu a/b 


 


Blood Type  A POSITIVE  


 


Antibody Screen  Negative  


 


BBK History Checked  Patient has bt  














  17





  23:55 01:42 01:55


 


WBC   


 


RBC   


 


Hgb   


 


Hct   


 


MCV   


 


MCH   


 


MCHC   


 


RDW   


 


Plt Count   


 


MPV   


 


Neut % (Auto)   


 


Lymph % (Auto)   


 


Mono % (Auto)   


 


Eos % (Auto)   


 


Baso % (Auto)   


 


Neut #   


 


Lymph #   


 


Mono #   


 


Eos #   


 


Baso #   


 


Neutrophils % (Manual)   


 


Band Neutrophils %   


 


Lymphocytes % (Manual)   


 


Reactive Lymphs %   


 


Monocytes % (Manual)   


 


Basophils % (Manual)   


 


Toxic Granulation   


 


Platelet Estimate   


 


Hypochromasia (manual)   


 


PT    12.6 H


 


INR    1.21 H


 


APTT    


 


pCO2   27 L 


 


pO2  48  86 


 


HCO3   23.8 


 


ABG pH   7.49 H 


 


ABG Total CO2   21.4 L 


 


ABG O2 Saturation   99.3 H 


 


ABG Base Excess   -1.4 


 


Saud Test   Yes 


 


ABG Potassium   3.2 L 


 


VBG pH  7.06 L*  


 


VBG pCO2  62 H  


 


VBG HCO3  13.6  


 


VBG Total CO2  19.5 L  


 


VBG O2 Sat (Calc)  72.0 H  


 


VBG Base Excess  -13.3 L  


 


A-a O2 Difference   30.0 


 


Sodium  110.0 L*  127.0 L  125 L


 


Chloride  93.0 L  101.0  99


 


Glucose  89  107 H 


 


Lactate  1.8  0.9 


 


Vent Mode   Room air 


 


FiO2  21.0  21.0 


 


Crit Value Called To  terrence Cassidy  


 


Crit Value Called By  Sb  


 


Crit Value Read Back  Y  


 


Blood Gas Notified Time  7  


 


Potassium    3.3 L


 


Carbon Dioxide    16 L


 


Anion Gap    13


 


BUN    21 H


 


Creatinine    0.7


 


Est GFR ( Amer)    > 60


 


Est GFR (Non-Af Amer)    > 60


 


Random Glucose    103


 


Calcium    7.9 L


 


Total Bilirubin    5.1 H


 


AST    58 H D


 


ALT    45


 


Alkaline Phosphatase    745 H D


 


Total Protein    6.8


 


Albumin    2.9 L


 


Globulin    3.9


 


Albumin/Globulin Ratio    0.7 L


 


Arterial Blood Potassium   3.2 L 


 


Influenza Typ A,B (EIA)   


 


Blood Type   


 


Antibody Screen   


 


BBK History Checked   














  03/14/17 03/15/17 03/16/17





  05:30 06:15 05:55


 


WBC  24.5 H  13.3 H  14.2 H


 


RBC  3.23 L  3.40 L  3.45 L


 


Hgb  10.2 L D  10.9 L  10.8 L


 


Hct  30.1 L  32.0 L  32.0 L


 


MCV  93.2  D  94.1  92.7


 


MCH  31.6 H  31.9 H  31.3 H


 


MCHC  34.0  34.0  33.7


 


RDW  13.3  13.3  13.0


 


Plt Count  170  169  248


 


MPV  9.4   8.9


 


Neut % (Auto)  83.2 H   77.0 H


 


Lymph % (Auto)  9.2 L   13.6 L


 


Mono % (Auto)  7.4   9.0


 


Eos % (Auto)  0.0   0.1


 


Baso % (Auto)  0.2   0.3


 


Neut #  20.3 H   10.9 H


 


Lymph #  2.3   1.9


 


Mono #  1.8 H   1.3 H


 


Eos #  0.0   0.0


 


Baso #  0.0   0.0


 


Neutrophils % (Manual)  75  


 


Band Neutrophils %  5 H  


 


Lymphocytes % (Manual)  9 L  


 


Reactive Lymphs %  4 H  


 


Monocytes % (Manual)  6  


 


Basophils % (Manual)  1  


 


Toxic Granulation  Present  


 


Platelet Estimate  Normal  


 


Hypochromasia (manual)  Slight  


 


PT   


 


INR   


 


APTT   


 


pCO2   


 


pO2   


 


HCO3   


 


ABG pH   


 


ABG Total CO2   


 


ABG O2 Saturation   


 


ABG Base Excess   


 


Saud Test   


 


ABG Potassium   


 


VBG pH   


 


VBG pCO2   


 


VBG HCO3   


 


VBG Total CO2   


 


VBG O2 Sat (Calc)   


 


VBG Base Excess   


 


A-a O2 Difference   


 


Sodium  126 L  141  138


 


Chloride  100  107  105


 


Glucose   


 


Lactate   


 


Vent Mode   


 


FiO2   


 


Crit Value Called To   


 


Crit Value Called By   


 


Crit Value Read Back   


 


Blood Gas Notified Time   


 


Potassium  3.4 L  3.8  3.3 L


 


Carbon Dioxide  18 L  20 L  22


 


Anion Gap  11  18  14


 


BUN  24 H  19 H  12


 


Creatinine  0.8  0.8  0.6 L


 


Est GFR ( Amer)  > 60  > 60  > 60


 


Est GFR (Non-Af Amer)  > 60  > 60  > 60


 


Random Glucose  120 H  86  91


 


Calcium  7.9 L  8.7  8.7


 


Total Bilirubin   


 


AST   


 


ALT   


 


Alkaline Phosphatase   


 


Total Protein   


 


Albumin   


 


Globulin   


 


Albumin/Globulin Ratio   


 


Arterial Blood Potassium   


 


Influenza Typ A,B (EIA)   


 


Blood Type   


 


Antibody Screen   


 


BBK History Checked   








 Microbiology





17 22:00   Blood-Venous   Blood Culture - Preliminary


                            NO GROWTH AFTER 48 HOURS


17 21:40   Blood-Venous   Blood Culture - Preliminary


                            NO GROWTH AFTER 48 HOURS





 Accession No. : [id number]


Patient Name / ID : TIFFANY MULLINS  / [id number]


Exam Date : 2017 23:57:22 ( Approved )


Study Comment : 


Sex / Age : F  / 76Y





Creator : Antonino Rivera MD


Dictator : 


 : 


Approver : Antonino Rivera MD


Approver2 : 





Report Date : 2017 00:26:00


My Comment : 


********************************************************************************

***





[facility]  


 [address] [place] [address]  


 Tel. no. [phone]   


 


 


Patient Name: SUSANNA ALLEN            Account #: [id number]  


Pt. Address: [address] [place] [address]. Rec #: [id number]  


                    [place] [address]            Ordering Dr: Ange JOHNSON,

Terrence MACKEY  


Pt Phone: [phone] HOME                             Order Location: 

[facility]  


: 1940 Female Age: 76            Order #: [id number]                   

                   


             Accession #: [id number]  


Reason for exam: fall   


 


 


 


 


 


 CT Scan  


 


 


HEAD W/O CONTRAST                              Exam Date: 17  


 


This imaging exam was performed at [facility]  


 


 


EXAM:  


   CT Head Without Intravenous Contrast.  


 


 CLINICAL HISTORY:  


   76 years old, female; Injury or trauma; Fall; Initial encounter; Blunt 

trauma   


 (contusions or hematomas)  


 


 TECHNIQUE:  


   Axial computed tomography images of the head/brain without intravenous   


 contrast.  This CT exam was performed using one or more of the following dose 

  


 reduction techniques:  automated exposure control, adjustment of the mA and/or

   


 kV according to patient size, and/or use of iterative reconstruction 

technique.  


   Coronal and sagittal reformatted images were created and reviewed.  


 


 COMPARISON:  


   CT - HEAD W/O CONTRAST 10/9/2014 8:14:09 AM  


 


 FINDINGS:  


   Brain:  Mild atrophy.  No intracranial hemorrhage.  No mass.  Calcifications

   


 of basal ganglia, deep white matter, cerebellum, stable.  Minimal decreased   


 attenuation within periventricular white matter.  No edema.  


   Ventricles:  No hydrocephalus.  


   Bones/joints:  No acute fracture.  


   Soft tissues:  Unremarkable.  


   Vasculature:  Minimal atherosclerotic disease of intracranial arteries.  


   Sinuses:  Near complete opacification of LEFT maxillary sinus.    


 Mild-to-moderate mucosal thickening/fluid of RIGHT maxillary sinus.    


 Mild-to-moderate mucosal thickening/fluid of sphenoid sinuses.  Mild mucosal   


 thickening/fluid of frontal sinuses.  Moderate to extensive mucosal   


 thickening/opacification of ethmoid sinuses.  


   Mastoid air cells:  Partial opacification of LEFT mastoid.  


   Orbits:  Unremarkable as visualized.  


 


 IMPRESSION:       


 1.  No intracranial hemorrhage.  


 2.  Nonspecific white matter changes.  


 3.  Sinus disease.  


 4.  Mastoid disease.  


 5.  Incidental/non-acute findings are described above.  


 


 


                                                            Dictated By:  

Antonino Rivera MD      


                                                            Dictated Date/Time:

  17  


                                                            Signed By: Antonino Rivera MD  


                                                            Date Signed: 26  


                                                Transcribed By: CHRISTIANE  


                                                            Transcribe Date/Time

: 17  


ACYP02/JARED








Accession No. : [id number]


Patient Name / ID : TIFFANY MULLINS  / [id number]


Exam Date : 2017 21:36:38 ( Approved )


Study Comment : 


Sex / Age : F  / 76Y





Creator : Kalyn Leblanc


Dictator : Kalyn Leblanc


 : 


Approver : Kalyn Leblanc


Approver2 : 





Report Date : 2017 10:19:47


My Comment : 


********************************************************************************

***





HISTORY:


cough  





COMPARISON:


Comparison is made to the previous study dated 2015





TECHNIQUE:


Chest PA and lateral





FINDINGS:





LUNGS:


Interval appearance of focal hazy opacity at the midportion of the right lung 

since the previous exam. Interval worsening of patchy opacities at the 

peripheral right upper lobe since the previous study.





PLEURA:


No significant pleural effusion identified. No pneumothorax apparent.





CARDIOVASCULAR:


Normal.





OSSEOUS STRUCTURES:


Diffuse osteopenia seen.





VISUALIZED UPPER ABDOMEN:


Normal.





OTHER FINDINGS:


None.





IMPRESSION:


Interval appearance of new opacities at the right mid and upper lung since the 

previous exam.Accession No. : [id number]


Patient Name / ID : TIFFANY MULLINS  / [id number]


Exam Date : 2017 00:20:28 ( Approved )


Study Comment : 


Sex / Age : F  / 76Y





Creator : Antonino Rivera MD


Dictator : 


 : 


Approver : Antonino Rivera MD


Approver2 : 





Report Date : 2017 00:53:00


My Comment : 


********************************************************************************

***





[facility]  


 [address] [place] [address]  


 Tel. no. [phone]   


 


 


Patient Name: SUSANNA ALLEN            Account #: [id number]  


Pt. Address: [address] [place] [address]. Rec #: [id number]  


                    [place] [address]            Ordering Dr: Ange JOHNSON,

Terrence MACKEY Pt Phone: [phone] HOME                             Order Location: 

[facility]  


: 1940 Female Age: 76            Order #: [id number]                   

                   


             Accession #: [id number]  


Reason for exam: fall, abd pain   


 


 


 


 


 


 CT Scan  


 


 


ABD   PELVIS PO CONTRAST ONLY                              Exam Date: 17  


 


This imaging exam was performed at [facility]  


 


 


EXAM:  


   CT Abdomen and Pelvis With Intravenous Contrast.  


 


 CLINICAL HISTORY:  


   76 years old, female; Pain; Abdominal pain; Flank; Right; Additional info:   


 Fall, abd pain  


 


 TECHNIQUE:  


   Axial computed tomography images of the abdomen and pelvis with intravenous 

  


 contrast.  This CT exam was performed using one or more of the following dose 

  


 reduction techniques:  automated exposure control, adjustment of the mA and/or

   


 kV according to patient size, and/or use of iterative reconstruction 

technique.  


   Coronal and sagittal reformatted images were created and reviewed.  


 


 COMPARISON:  


   No relevant prior studies available.  


 


 FINDINGS:  


   Limitations:  Lack of intravenous contrast.  Motion artifact - mild.  


   Lower thorax:  Minimal atelectasis.  Mild patchy and confluent airspace   


 opacities RIGHT middle, LEFT lower lobes.  


 


  ABDOMEN:  


   Liver:  Unremarkable.  No mass.  


   Gallbladder and bile ducts:  Calcified gallstones.  No ductal dilation.  


   Pancreas:  No ductal dilation.  No mass.  


   Spleen:  No splenomegaly.  


   Adrenals:  No mass.  


   Kidneys and ureters:  No renal calculi.  


   Stomach and bowel:  No definite mural thickening.  No obstruction.  


   Appendix:  No findings to suggest acute appendicitis.  


 


  PELVIS:  


   Bladder:  Unremarkable.  


   Reproductive:  Unremarkable as visualized.  


 


  ABDOMEN and PELVIS:  


   Intraperitoneal space:  No significant fluid collection.  No free air.  


   Bones/joints:  Acute angulation anterior cortex distal sacrum.  


   Soft tissues:  Unremarkable.  


   Vasculature:  Unremarkable.  No abdominal aortic aneurysm.  


   Lymph nodes:  No pathologically enlarged lymph nodes.  


 


 IMPRESSION:       


 1.  Probable multifocal pneumonia. Followup to resolution to exclude 

underlying   


 pathology.  


 2.  Suspect sacral fracture.  Correlate clinically for pain in region.  


 3.  Cholelithiasis.  


 4.  Incidental/non-acute findings are described above.





Assessment & Plan


(1) CAP (community acquired pneumonia)


Status: Acute   Priority: High





(2) Sinusitis, acute


Status: Acute





(3) Mastoiditis


Status: Acute








- Assessment and Plan (Free Text)


Assessment: 


A/P-


77 y/o female with crest syndrome admitted post fall and on imaging found to 

have CAP, sinusitis and mastoidits.





Pt. seems to be doing clinically much better based on her lab results .


her leukocytois si much improved from [id number] to [id number].


afebrile


blood cx- neg x 2





plan- 


advise to continue with current abx regimen of IV levaquin which would treat 

both CAP and the mastoiditis/sinusitis.


would also advise to add vanco to the regimen for now .


no objection to continuing with otic cipro drops as well.


would need ENT evaluation of the mastoiditis, may need cx from the auditory 

canal by ENT.


monitor wbc .











All above d/w patient and the hospitalist.





Thank you for allowing met o take part in the care oft his patient.

## 2017-03-16 NOTE — CP.PCM.PN
Subjective





- Date & Time of Evaluation


Date of Evaluation: 03/16/17


Time of Evaluation: 21:14





- Subjective


Subjective: 


see below





Objective





- Vital Signs/Intake and Output


Vital Signs (last 24 hours): 


 











Temp Pulse Resp BP Pulse Ox


 


 98.5 F   89   18   117/66   99 


 


 03/16/17 16:10  03/16/17 16:10  03/16/17 16:10  03/16/17 16:10  03/16/17 16:10








Intake and Output: 


 











 03/16/17 03/17/17





 18:59 06:59


 


Intake Total 815 


 


Balance 815 














- Medications


Medications: 


 Current Medications





Acetaminophen (Tylenol 325mg Tab)  650 mg PO Q6 PRN


   PRN Reason: Pain, Mild (1-3)


   Last Admin: 03/14/17 15:22 Dose:  650 mg


Acetaminophen (Tylenol 325mg Tab)  650 mg PO Q6 PRN


   PRN Reason: Fever >100.4 F


Albuterol/Ipratropium (Duoneb 3 Mg/0.5 Mg (3 Ml) Ud)  3 ml INH RQ6 PRN


   PRN Reason: Shortness of Breath


Ciprofloxacin/Dexamethasone (Ciprodex Otic)  4 drop AD BID [facility]


   Last Admin: 03/16/17 16:46 Dose:  4 drop


Enoxaparin Sodium (Lovenox)  40 mg SC DAILY [facility]


   PRN Reason: Protocol


   Last Admin: 03/16/17 09:11 Dose:  40 mg


Home Med (Patient's Own Medication)  1 unit OU DAILY [facility]


   Last Admin: 03/16/17 09:11 Dose:  1 unit


Levofloxacin/Dextrose (Levaquin 750mg)  150 mls @ 100 mls/hr IVPB DAILY [facility]


   Last Admin: 03/16/17 09:12 Dose:  100 mls/hr


Vancomycin HCl 1 gm/ Sodium (Chloride)  250 mls @ 166.667 mls/hr IVPB DAILY [facility]


   Last Admin: 03/16/17 16:57 Dose:  166.667 mls/hr


Olopatadine HCl (Patanol 0.1% Opht Soln)  1 drop OU DAILY [facility]


   Last Admin: 03/16/17 09:12 Dose:  1 drop


Ondansetron HCl (Zofran Inj)  4 mg IVP Q6 PRN


   PRN Reason: Nausea/Vomiting


   Last Admin: 03/14/17 13:20 Dose:  4 mg


Pantoprazole Sodium (Protonix Ec Tab)  40 mg PO DAILY [facility]


Pentoxifylline (Pentoxil)  400 mg PO TID [facility]


   Last Admin: 03/16/17 16:49 Dose:  400 mg


Saccharomyces Boulardii (Florastor)  250 mg PO BID [facility]


Tramadol HCl (Ultram)  50 mg PO Q4 PRN


   PRN Reason: Pain, moderate (4-7)


Tramadol HCl (Ultram)  100 mg PO Q4 PRN


   PRN Reason: Pain, severe (8-10)











- Labs


Labs: 


 





 03/16/17 05:55 





 03/16/17 05:55 





 











PT  12.6 SECONDS (9.6-11.2)  H  03/14/17  01:55    


 


INR  1.21  (0.92-1.08)  H  03/14/17  01:55    


 


APTT   SECONDS (23.3-32.5)   03/14/17  01:55    














Assessment and Plan





- Assessment and Plan (Free Text)


Assessment: 


History of Present Illness


75 y/o female admitted 3 days ago with URI symptoms including cough, headache 

and left ear pain with drainage of several days duration.  CT read as left 

"mastoiditis."  Cllinically patient is much better since being started on 

Levaquin and Ciprodex ear drops.  No further ear pain.  Headaches resolved.  

She has hearing loss of long duration.  Her daughter was present at bedside 

today.





Past Medical Hx


CREST syndrome


Raynouds





Social Hx


non-contributory





Family Hx


non-contributory





Review of Systems


see  HPI





Exam


awake, alert, comfortable


oc/op clear, no palate asymmetry or swelling


face symmetric, non facial/sinus tenderness


right EAC and TM clear


left EAC and TM clear; no active infection; no mastoid swelling or tenderness


nose clear; inferior turbinates 2+; dry mucus membranes


eomi





CT shows patchy left mastoid opacification; no evidence of acute mastoiditis





Impression


left acute otitis media with rupture vs. acute otitis externa, improving on 

systemic and topical abx; no evidence of acute mastoiditis on exam or imaging





Recommend


as patient has done well with Levaquin, i would complete 14 day course of this 

medication


continue Ciprodex ear drops for total duration of 7 days


Afrin x3 days


o/p follow-up


d/w patient and daughter, who was at bedside

## 2017-03-17 VITALS
DIASTOLIC BLOOD PRESSURE: 60 MMHG | SYSTOLIC BLOOD PRESSURE: 107 MMHG | OXYGEN SATURATION: 98 % | TEMPERATURE: 98.9 F | HEART RATE: 55 BPM

## 2017-03-17 VITALS — RESPIRATION RATE: 20 BRPM

## 2017-03-17 LAB
ALBUMIN/GLOB SERPL: 0.7 {RATIO} (ref 1–2.1)
ALP SERPL-CCNC: 652 U/L (ref 38–126)
ALT SERPL-CCNC: 46 U/L (ref 9–52)
AST SERPL-CCNC: 68 U/L (ref 14–36)
BASOPHILS # BLD AUTO: 0.1 K/UL (ref 0–0.2)
BASOPHILS NFR BLD: 0.7 % (ref 0–2)
BILIRUB SERPL-MCNC: 2.7 MG/DL (ref 0.2–1.3)
BUN SERPL-MCNC: 13 MG/DL (ref 7–17)
CALCIUM SERPL-MCNC: 8.6 MG/DL (ref 8.4–10.2)
CHLORIDE SERPL-SCNC: 103 MMOL/L (ref 98–107)
CO2 SERPL-SCNC: 24 MMOL/L (ref 22–30)
EOSINOPHIL # BLD AUTO: 0.1 K/UL (ref 0–0.7)
EOSINOPHIL NFR BLD: 0.5 % (ref 0–4)
ERYTHROCYTE [DISTWIDTH] IN BLOOD BY AUTOMATED COUNT: 13.1 % (ref 11.5–14.5)
GLOBULIN SER-MCNC: 3.7 GM/DL (ref 2.2–3.9)
GLUCOSE SERPL-MCNC: 87 MG/DL (ref 65–105)
HCT VFR BLD CALC: 29.7 % (ref 34–47)
LYMPHOCYTES # BLD AUTO: 2.5 K/UL (ref 1–4.3)
LYMPHOCYTES NFR BLD AUTO: 23.1 % (ref 20–40)
MAGNESIUM SERPL-MCNC: 1.9 MG/DL (ref 1.6–2.3)
MCH RBC QN AUTO: 32 PG (ref 27–31)
MCHC RBC AUTO-ENTMCNC: 34.3 G/DL (ref 33–37)
MCV RBC AUTO: 93.3 FL (ref 81–99)
MONOCYTES # BLD: 1.2 K/UL (ref 0–0.8)
MONOCYTES NFR BLD: 11.1 % (ref 0–10)
NEUTROPHILS # BLD: 7.1 K/UL (ref 1.8–7)
NEUTROPHILS NFR BLD AUTO: 64.6 % (ref 50–75)
NRBC BLD AUTO-RTO: 0.1 % (ref 0–0)
PHOSPHATE SERPL-MCNC: 3 MG/DL (ref 2.5–4.5)
PLATELET # BLD: 210 K/UL (ref 130–400)
PMV BLD AUTO: 9 FL (ref 7.2–11.7)
POTASSIUM SERPL-SCNC: 3.3 MMOL/L (ref 3.6–5)
PROT SERPL-MCNC: 6.3 G/DL (ref 6.3–8.2)
SODIUM SERPL-SCNC: 133 MMOL/L (ref 132–148)
WBC # BLD AUTO: 10.9 K/UL (ref 4.8–10.8)

## 2017-03-17 RX ADMIN — ENOXAPARIN SODIUM SCH MG: 40 INJECTION SUBCUTANEOUS at 09:12

## 2017-03-17 RX ADMIN — CIPROFLOXACIN AND DEXAMETHASONE SCH DROP: 3; 1 SUSPENSION/ DROPS AURICULAR (OTIC) at 09:11

## 2017-03-17 RX ADMIN — OLOPATADINE HYDROCHLORIDE SCH DROP: 1 SOLUTION/ DROPS OPHTHALMIC at 09:13

## 2017-03-17 RX ADMIN — Medication SCH MG: at 09:11

## 2017-03-17 NOTE — CP.PCM.PN
Subjective





- Date & Time of Evaluation


Date of Evaluation: 03/17/17


Time of Evaluation: 13:00





- Subjective


Subjective: 


ID Note-





Pt. seen and examined today.


Pt. states she feels much better,


She denies any fever or chills.


sattes her left ear pain is pretty much resolved, denies any HA, denies any sob.





Pt. was seen by ENT.





AS per  pt. to be d/c home today.











Objective





- Vital Signs/Intake and Output


Vital Signs (last 24 hours): 


 











Temp Pulse Resp BP Pulse Ox


 


 98.9 F   55 L  20   107/60   98 


 


 03/17/17 08:07  03/17/17 08:07  03/17/17 08:07  03/17/17 08:07  03/17/17 08:07











- Medications


Medications: 


 Current Medications





Acetaminophen (Tylenol 325mg Tab)  650 mg PO Q6 PRN


   PRN Reason: Pain, Mild (1-3)


   Last Admin: 03/14/17 15:22 Dose:  650 mg


Acetaminophen (Tylenol 325mg Tab)  650 mg PO Q6 PRN


   PRN Reason: Fever >100.4 F


Albuterol/Ipratropium (Duoneb 3 Mg/0.5 Mg (3 Ml) Ud)  3 ml INH RQ6 PRN


   PRN Reason: Shortness of Breath


Ciprofloxacin/Dexamethasone (Ciprodex Otic)  4 drop AD BID [facility]


   Last Admin: 03/16/17 16:46 Dose:  4 drop


Enoxaparin Sodium (Lovenox)  40 mg SC DAILY [facility]


   PRN Reason: Protocol


   Last Admin: 03/16/17 09:11 Dose:  40 mg


Home Med (Patient's Own Medication)  1 unit OU DAILY [facility]


   Last Admin: 03/16/17 09:11 Dose:  1 unit


Levofloxacin/Dextrose (Levaquin 750mg)  150 mls @ 100 mls/hr IVPB DAILY [facility]


   Last Admin: 03/16/17 09:12 Dose:  100 mls/hr


Vancomycin HCl 1 gm/ Sodium (Chloride)  250 mls @ 166.667 mls/hr IVPB DAILY [facility]


   Last Admin: 03/16/17 16:57 Dose:  166.667 mls/hr


Olopatadine HCl (Patanol 0.1% Opht Soln)  1 drop OU DAILY [facility]


   Last Admin: 03/16/17 09:12 Dose:  1 drop


Ondansetron HCl (Zofran Inj)  4 mg IVP Q6 PRN


   PRN Reason: Nausea/Vomiting


   Last Admin: 03/14/17 13:20 Dose:  4 mg


Oxymetazoline HCl (Nasal Decongestant 15 Ml)  2 spr NS Q12 [facility]


   Stop: 03/19/17 21:16


   Last Admin: 03/16/17 21:56 Dose:  2 spr


Pantoprazole Sodium (Protonix Ec Tab)  40 mg PO DAILY [facility]


Pentoxifylline (Pentoxil)  400 mg PO TID [facility]


   Last Admin: 03/16/17 16:49 Dose:  400 mg


Saccharomyces Boulardii (Florastor)  250 mg PO BID [facility]


   Last Admin: 03/16/17 21:18 Dose:  250 mg


Tramadol HCl (Ultram)  50 mg PO Q4 PRN


   PRN Reason: Pain, moderate (4-7)


Tramadol HCl (Ultram)  100 mg PO Q4 PRN


   PRN Reason: Pain, severe (8-10)


   Last Admin: 03/16/17 21:18 Dose:  100 mg











- Labs


Labs: 


 





 





- Additional Findings


Additional findings: 





- Constitutional


Appears: Non-toxic, No Acute Distress





- Head Exam


Head Exam: ATRAUMATIC





- Eye Exam


Eye Exam: EOMI, PERRL





- ENT Exam


ENT Exam: Normal Oropharynx


Additional comments: 


no tenderness around the ears, no discharge from the ears, clear





- Neck Exam


Neck exam: Positive for: Full Rom





- Respiratory Exam


Respiratory Exam: NORMAL BREATHING PATTERN


Additional comments: 


good aeration b/l today





- Cardiovascular Exam


Cardiovascular Exam: RRR, +S1, +S2





- GI/Abdominal Exam


GI & Abdominal Exam: Normal Bowel Sounds, Soft


Additional comments: 


NT, ND





- Extremities Exam


Additional comments: 


no edema B/L LE





- Neurological Exam


Neurological exam: Alert, Oriented x 3





 Laboratory Results - last 72 hr











  03/15/17 03/16/17 03/17/17





  06:15 05:55 06:05


 


WBC  13.3 H  14.2 H  10.9 H


 


RBC  3.40 L  3.45 L  3.18 L


 


Hgb  10.9 L  10.8 L  10.2 L


 


Hct  32.0 L  32.0 L  29.7 L


 


MCV  94.1  92.7  93.3


 


MCH  31.9 H  31.3 H  32.0 H


 


MCHC  34.0  33.7  34.3


 


RDW  13.3  13.0  13.1


 


Plt Count  169  248  210


 


MPV   8.9  9.0


 


Neut % (Auto)   77.0 H  64.6


 


Lymph % (Auto)   13.6 L  23.1


 


Mono % (Auto)   9.0  11.1 H


 


Eos % (Auto)   0.1  0.5


 


Baso % (Auto)   0.3  0.7


 


Neut #   10.9 H  7.1 H


 


Lymph #   1.9  2.5


 


Mono #   1.3 H  1.2 H


 


Eos #   0.0  0.1


 


Baso #   0.0  0.1


 


Sodium  141  138  133


 


Potassium  3.8  3.3 L  3.3 L


 


Chloride  107  105  103


 


Carbon Dioxide  20 L  22  24


 


Anion Gap  18  14  9 L


 


BUN  19 H  12  13


 


Creatinine  0.8  0.6 L  0.5 L


 


Est GFR ( Amer)  > 60  > 60  > 60


 


Est GFR (Non-Af Amer)  > 60  > 60  > 60


 


Random Glucose  86  91  87


 


Calcium  8.7  8.7  8.6


 


Phosphorus    3.0


 


Magnesium    1.9


 


Total Bilirubin    2.7 H


 


AST    68 H


 


ALT    46


 


Alkaline Phosphatase    652 H


 


Total Protein    6.3


 


Albumin    2.6 L


 


Globulin    3.7


 


Albumin/Globulin Ratio    0.7 L








 Microbiology





03/13/17 22:00   Blood-Venous   Blood Culture - Preliminary


                            NO GROWTH AFTER 3 DAYS


03/13/17 21:40   Blood-Venous   Blood Culture - Preliminary


                            NO GROWTH AFTER 3 DAYS





 














Assessment and Plan


(1) CAP (community acquired pneumonia)


Status: Acute





(2) Sinusitis, acute


Status: Acute





(3) Mastoiditis


Status: Acute








- Assessment and Plan (Free Text)


Assessment: 


A/P-


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 

have CAP, sinusitis and ? mastoidits.





pt. clinically much improved.


her leukocytois has resolved.


afebrile


blood cx- neg x 2


as per ENT no acute mastoiditis and most likely otitis externa and to continue 

with cipro otic drops and oral levaquin.





plan- 


advise to complete total of 14 days of levaquin for this otitis externa vs ?? 

mastoiditis since she has responded very well tot his regimen.


has already completed 4 days of this abx so need another 10 days of levaquin 

orally.


continue with otic cipro drops as per ENT as well.


d/c vancomycin.


Pt. to have f/u with ENT as outpatient, may need repeat CT as outpatient.


Pt. to also be f/u by her PCP as outpatient next week and to have repeat CXR as 

outpatient.





All above d/w patient and the hospitalist .

## 2017-03-17 NOTE — CP.PCM.DIS
Provider





- Provider


Date of Admission: 


03/14/17 02:17





Attending physician: 


Rodriguez Hall MD





Primary care physician: 


Dr. Gray


Consults: 


ENT consult


ID consult 


PT consult 


Time Spent in preparation of Discharge (in minutes): 30





Hospital Course





- Lab Results


Lab Results: 


 Most Recent Lab Values











WBC  10.9 K/uL (4.8-10.8)  H  03/17/17  06:05    


 


RBC  3.18 Mil/uL (3.80-5.20)  L  03/17/17  06:05    


 


Hgb  10.2 g/dL (12.0-16.0)  L  03/17/17  06:05    


 


Hct  29.7 % (34.0-47.0)  L  03/17/17  06:05    


 


MCV  93.3 fl (81.0-99.0)   03/17/17  06:05    


 


MCH  32.0 pg (27.0-31.0)  H  03/17/17  06:05    


 


MCHC  34.3 g/dL (33.0-37.0)   03/17/17  06:05    


 


RDW  13.1 % (11.5-14.5)   03/17/17  06:05    


 


Plt Count  210 K/uL (130-400)   03/17/17  06:05    


 


MPV  9.0 fl (7.2-11.7)   03/17/17  06:05    


 


Neut % (Auto)  64.6 % (50.0-75.0)   03/17/17  06:05    


 


Lymph % (Auto)  23.1 % (20.0-40.0)   03/17/17  06:05    


 


Mono % (Auto)  11.1 % (0.0-10.0)  H  03/17/17  06:05    


 


Eos % (Auto)  0.5 % (0.0-4.0)   03/17/17  06:05    


 


Baso % (Auto)  0.7 % (0.0-2.0)   03/17/17  06:05    


 


Neut #  7.1 K/uL (1.8-7.0)  H  03/17/17  06:05    


 


Lymph #  2.5 K/uL (1.0-4.3)   03/17/17  06:05    


 


Mono #  1.2 K/uL (0.0-0.8)  H  03/17/17  06:05    


 


Eos #  0.1 K/uL (0.0-0.7)   03/17/17  06:05    


 


Baso #  0.1 K/uL (0.0-0.2)   03/17/17  06:05    


 


Neutrophils % (Manual)  75 % (42-75)   03/14/17  05:30    


 


Band Neutrophils %  5 % (0-2)  H  03/14/17  05:30    


 


Lymphocytes % (Manual)  9 % (20-50)  L  03/14/17  05:30    


 


Reactive Lymphs %  4 % (0-0)  H  03/14/17  05:30    


 


Monocytes % (Manual)  6 % (0-10)   03/14/17  05:30    


 


Basophils % (Manual)  1 % (0-2)   03/14/17  05:30    


 


Toxic Granulation  Present   03/14/17  05:30    


 


Platelet Estimate  Normal  (NORMAL)   03/14/17  05:30    


 


Hypochromasia (manual)  Slight   03/14/17  05:30    


 


PT  12.6 SECONDS (9.6-11.2)  H  03/14/17  01:55    


 


INR  1.21  (0.92-1.08)  H  03/14/17  01:55    


 


APTT   SECONDS (23.3-32.5)   03/14/17  01:55    


 


pCO2  27 mm/Hg (35-45)  L  03/14/17  01:42    


 


pO2  86 mm/Hg ()   03/14/17  01:42    


 


HCO3  23.8 mmol/L (21-28)   03/14/17  01:42    


 


ABG pH  7.49  (7.35-7.45)  H  03/14/17  01:42    


 


ABG Total CO2  21.4 mmol/L (22-28)  L  03/14/17  01:42    


 


ABG O2 Saturation  99.3 % (95-98)  H  03/14/17  01:42    


 


ABG Base Excess  -1.4 mmol/L (-2.0-3.0)   03/14/17  01:42    


 


Saud Test  Yes   03/14/17  01:42    


 


ABG Potassium  3.2 mmol/L (3.6-5.2)  L  03/14/17  01:42    


 


VBG pH  7.06  (7.32-7.43)  L*  03/13/17  23:55    


 


VBG pCO2  62 mmHg (40-60)  H  03/13/17  23:55    


 


VBG HCO3  13.6 mmol/L  03/13/17  23:55    


 


VBG Total CO2  19.5 mmol/L (22-28)  L  03/13/17  23:55    


 


VBG O2 Sat (Calc)  72.0 % (40-65)  H  03/13/17  23:55    


 


VBG Base Excess  -13.3 mmol/L (0.0-2.0)  L  03/13/17  23:55    


 


A-a O2 Difference  30.0 mm/Hg  03/14/17  01:42    


 


Sodium  127.0 mmol/L (132-148)  L  03/14/17  01:42    


 


Chloride  101.0 mmol/L ()   03/14/17  01:42    


 


Glucose  107 mg/dL ()  H  03/14/17  01:42    


 


Lactate  0.9 mmol/L (0.7-2.1)   03/14/17  01:42    


 


Vent Mode  Room air   03/14/17  01:42    


 


FiO2  21.0 %  03/14/17  01:42    


 


Crit Value Called To  prem Cassidy   03/13/17  23:55    


 


Crit Value Called By  Sb   03/13/17  23:55    


 


Crit Value Read Back  Y   03/13/17  23:55    


 


Blood Gas Notified Time  7   03/13/17  23:55    


 


Sodium  133 mmol/l (132-148)   03/17/17  06:05    


 


Potassium  3.3 MMOL/L (3.6-5.0)  L  03/17/17  06:05    


 


Chloride  103 mmol/L ()   03/17/17  06:05    


 


Carbon Dioxide  24 mmol/L (22-30)   03/17/17  06:05    


 


Anion Gap  9  (10-20)  L  03/17/17  06:05    


 


BUN  13 mg/dl (7-17)   03/17/17  06:05    


 


Creatinine  0.5 mg/dL (0.7-1.2)  L  03/17/17  06:05    


 


Est GFR ( Amer)  > 60   03/17/17  06:05    


 


Est GFR (Non-Af Amer)  > 60   03/17/17  06:05    


 


Random Glucose  87 mg/dL ()   03/17/17  06:05    


 


Calcium  8.6 mg/dL (8.4-10.2)   03/17/17  06:05    


 


Phosphorus  3.0 mg/dl (2.5-4.5)   03/17/17  06:05    


 


Magnesium  1.9 MG/DL (1.6-2.3)   03/17/17  06:05    


 


Total Bilirubin  2.7 mg/dl (0.2-1.3)  H  03/17/17  06:05    


 


AST  68 U/L (14-36)  H  03/17/17  06:05    


 


ALT  46 U/L (9-52)   03/17/17  06:05    


 


Alkaline Phosphatase  652 U/L ()  H  03/17/17  06:05    


 


Total Protein  6.3 G/DL (6.3-8.2)   03/17/17  06:05    


 


Albumin  2.6 g/dL (3.5-5.0)  L  03/17/17  06:05    


 


Globulin  3.7 gm/dL (2.2-3.9)   03/17/17  06:05    


 


Albumin/Globulin Ratio  0.7  (1.0-2.1)  L  03/17/17  06:05    


 


Arterial Blood Potassium  3.2 mmol/L (3.6-5.2)  L  03/14/17  01:42    


 


Influenza Typ A,B (EIA)  Negative for flu a/b  (NEGATIVE)   03/13/17  21:33    


 


Blood Type  A POSITIVE   03/13/17  00:24    


 


Antibody Screen  Negative   03/13/17  00:24    


 


BBK History Checked  Patient has bt   03/13/17  00:24    














- Hospital Course


Hospital Course: 


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 

drop came in for evaluation after a fall several days ago in the bathtub after 

slipping on soap. She has been feeling pain on her  R flank  . She did not hit 

her head or have LOC. She also noted some respiratory symptoms and fatigue, 

nasal congestion ,cough for several days that  got worse over past 2 days. 

Denies f/c/n/v/d. Patient also was complaining of pain over left ear and 

yellowish drainage was noted .CT head showed no acute fracture No intracranial 

hemorrhage, Nonspecific white matter changes.  , Sinus disease.  ,  Mastoid 

disease.   


CXR showed multifocal pneumonia


CT pelvis showed suspected sacral fracture


patient was admitted with diagnosis of CAP  acute sinusitis and otitis . sShe 

was started on Levaquine IV  and Ciprodex ear drops.


ID and ENt were consulted 


Vancomycin was added to her antibiotic regiment . 


As per ENT patient does not have mastoiditis , but most likely sinusitis and 

otitis. Patoient clinically improved . her WBC count significantly dropped from 

28 k -- 10.9K , cultures were with no growth.


After discussion with consultants decided to discharge patient home on PO 

lavaquine for toatl 14 day s9 10 more days 0 Ciprodex ear drops and Afrin 


Will discharge patient with family. 


All questions answered .








1. Fall 


complaining of right flank pain 


Ct pelvis showed suspected sacral fracture. Given her clinical presentation 

sacral fracture is unlikely .No tenderness to sacral area with palpation, no 

hematoma  or pain with movement 


Ct head showed no acute pathology


Started physical therapy with full weight bearing to LE.


patient uses  brace to LLE for  chronic left foot drop 


referred for home PT and visiting nurse 


Started lidoderm poatch to right flan , Ultram PRN 











2.CAP (community acquired pneumonia)


CXR showed multifocal pneumonia to the right hemithorax ( patient has history 

of interstitial lung disease)


WBC elevated to 28k now down to 10.9 K


Continue Levaquin IV








3. Acute Sinusitis/Otitis - unlikely mastoiditi s


pt complained  of left ear pain and hearing loss


Yellowish discharge noticed on admission 


Ct heda sjhowed sinus disease


started  Cipro otic ear drops and levaquine IV with improvemnet


ID and ENt consulted 


continue Levaquine for total 14 days, Ciprodex ear dropps for 7 day sand afrin 

for sinuses


patient to follow up with Dr. Hancock as out patient 








 


4.Hyponatremia/non anion gap metabolic acidosis, improving


Most likely secondary to volume depletion


resolved 





5. Hearing loss


As per daughter patient developed hearing loss after was treated with 

antibiotics for sepsis years ago 


Worsening hearing loss most likely related to age and  otitis


will need evaluation with audiogram and ENT  as out patient 





6. Mild anemia


Most likely dilutional








7. Glaucoma


resumed eye drops








8.Interstitial lung disease/ Crest syndrome


on Pentoxyfilline








9 DVT prophylaxis


lovenox while in patient 





10. Hypokalemia


due to poor po intake


resolved 











Discharge Exam





- Head Exam


Head Exam: ATRAUMATIC, NORMOCEPHALIC





- Eye Exam


Eye Exam: PERRL


Pupil Exam: PERRL





- ENT Exam


ENT Exam: Mucous Membranes Moist, Normal Exam


Additional comments: 


hard on hearing 





- Neck Exam


Neck exam: Full Rom





- Respiratory Exam


Respiratory Exam: Clear to PA & Lateral, NORMAL BREATHING PATTERN.  absent: 

Rhonchi, Wheezes, Respiratory Distress





- Cardiovascular Exam


Cardiovascular Exam: REGULAR RHYTHM.  absent: JVD





- GI/Abdominal Exam


GI & Abdominal Exam: Normal Bowel Sounds, Soft.  absent: Distended, Guarding, 

Rebound, Tenderness





- Rectal Exam


Rectal Exam: Deferred





- Extremities Exam


Extremities exam: normal inspection


Additional comments: 


left foot drop 





- Back Exam


Back exam: NORMAL INSPECTION





- Neurological Exam


Neurological exam: Alert, CN II-XII Intact, Oriented x3





- Psychiatric Exam


Psychiatric exam: Normal Affect, Normal Mood





- Skin


Skin Exam: Dry, Normal Color, Warm





Discharge Plan





- Discharge Medications


Prescriptions: 


Oxymetazoline 0.05% [Afrin 0.05%] 30 spr NS Q12H #1 bottle


Ciprofloxacin/Dexamethasone [Ciprodex Otic] 75 drop AU BID #1 bottle


levoFLOXacin [Levaquin] 750 mg PO DAILY #10 tab


Lidocaine 5% [Lidoderm] 1 ea TD DAILY #5 patch


Famotidine [Pepcid] 20 mg PO BID #60 tab


Rosuvastatin Calcium 5 mg PO DAILY #30 tablet


traMADol [Ultram] 50 mg PO TID PRN #20 tab


 PRN Reason: Pain, Moderate (4-7)





- Follow Up Plan


Condition: STABLE


Disposition: HOME/ ROUTINE


Patient education suggested?: Yes


Instructions:  Viral Pneumonia (DC), Otitis Media (DC)


Referrals: 


Kelby Hancock MD [Medical Doctor] - 


Braden Gray MD [Family Provider] -

## 2017-03-20 NOTE — CARD
--------------- APPROVED REPORT --------------





EKG Measurement

Heart [id number]

DC [id number]

[id number]

[id number]

[id number]



<Conclusion>

Normal sinus rhythm

Possible Left atrial enlargement

Borderline ECG

## 2018-12-17 ENCOUNTER — HOSPITAL ENCOUNTER (EMERGENCY)
Dept: HOSPITAL 14 - H.ER | Age: 78
Discharge: HOME | End: 2018-12-17
Payer: MEDICARE

## 2018-12-17 VITALS — BODY MASS INDEX: 22.8 KG/M2

## 2018-12-17 VITALS
HEART RATE: 90 BPM | OXYGEN SATURATION: 96 % | DIASTOLIC BLOOD PRESSURE: 70 MMHG | SYSTOLIC BLOOD PRESSURE: 124 MMHG | RESPIRATION RATE: 20 BRPM | TEMPERATURE: 98.2 F
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
## 2018-12-17 DIAGNOSIS — E78.00: ICD-10-CM

## 2018-12-17 DIAGNOSIS — M79.605: Primary | ICD-10-CM

## 2018-12-17 DIAGNOSIS — I11.0: ICD-10-CM

## 2018-12-17 LAB
ALBUMIN SERPL-MCNC: 3.1 G/DL (ref 3.5–5)
ALBUMIN/GLOB SERPL: 0.7 {RATIO} (ref 1–2.1)
ALT SERPL-CCNC: 45 U/L (ref 9–52)
AST SERPL-CCNC: 78 U/L (ref 14–36)
BACTERIA #/AREA URNS HPF: (no result) /[HPF]
BASOPHILS # BLD AUTO: 0 K/UL (ref 0–0.2)
BASOPHILS NFR BLD: 0.8 % (ref 0–2)
BILIRUB UR-MCNC: NEGATIVE MG/DL
BUN SERPL-MCNC: 17 MG/DL (ref 7–17)
CALCIUM SERPL-MCNC: 8.8 MG/DL (ref 8.4–10.2)
COLOR UR: (no result)
EOSINOPHIL # BLD AUTO: 0.1 K/UL (ref 0–0.7)
EOSINOPHIL NFR BLD: 1.1 % (ref 0–4)
ERYTHROCYTE [DISTWIDTH] IN BLOOD BY AUTOMATED COUNT: 13.5 % (ref 11.5–14.5)
GFR NON-AFRICAN AMERICAN: > 60
GLUCOSE UR STRIP-MCNC: (no result) MG/DL
HGB BLD-MCNC: 11.5 G/DL (ref 12–16)
LEUKOCYTE ESTERASE UR-ACNC: (no result) LEU/UL
LYMPHOCYTES # BLD AUTO: 1 K/UL (ref 1–4.3)
LYMPHOCYTES NFR BLD AUTO: 16.9 % (ref 20–40)
MCH RBC QN AUTO: 31.9 PG (ref 27–31)
MCHC RBC AUTO-ENTMCNC: 32.9 G/DL (ref 33–37)
MCV RBC AUTO: 96.7 FL (ref 81–99)
MONOCYTES # BLD: 0.5 K/UL (ref 0–0.8)
MONOCYTES NFR BLD: 8.8 % (ref 0–10)
NEUTROPHILS # BLD: 4.3 K/UL (ref 1.8–7)
NEUTROPHILS NFR BLD AUTO: 72.4 % (ref 50–75)
NRBC BLD AUTO-RTO: 0 % (ref 0–0)
PH UR STRIP: 6 [PH] (ref 5–8)
PLATELET # BLD: 236 K/UL (ref 130–400)
PMV BLD AUTO: 9.9 FL (ref 7.2–11.7)
PROT UR STRIP-MCNC: NEGATIVE MG/DL
RBC # BLD AUTO: 3.6 MIL/UL (ref 3.8–5.2)
RBC # UR STRIP: NEGATIVE /UL
SP GR UR STRIP: 1.02 (ref 1–1.03)
SQUAMOUS EPITHIAL: < 1 /HPF (ref 0–5)
URINE AMORPHOUS SEDIMENT: (no result) /UL
URINE CLARITY: (no result)
UROBILINOGEN UR-MCNC: 2 MG/DL (ref 0.2–1)
WBC # BLD AUTO: 6 K/UL (ref 4.8–10.8)

## 2018-12-17 NOTE — ED PDOC
HPI: Trauma/Fall





- HPI


Time Seen by Provider: 18 12:28


Chief Complaint (Nursing): Trauma


Chief Complaint (Provider): Left lower extremity problem/injury


History Per: Patient, Family (daughter)


History/Exam Limitations: no limitations


Onset/Duration Of Symptoms: Days (7x)


Injury Occurred (Timing): Days Ago: (7x)


Location Of Injury: Left: Leg


Severity: Moderate


Associated Symptoms: denies: LOC ((-) head injury)


Additional Complaint(s): 





78 year old female with a past medical history scleroderma (subdivision: crest 

syndrome) presents to the ED for an evaluation of a left lower extremity injury 

that occurred 7x days ago. As per patient's daughter, as witnessed by her son 

(age 17), patient was walking around with a cane (usually uses a walker), when 

her flip flop caught onto the side of their carpet, and she fell. Patient denies

head  injury, and denies chest pain and dizziness prior to fall. Patient reports

that there is no visible injury, but is complaining of left leg pain. Daughter 

states that patient takes tramadol daily ( mg) for pain.





Of note: Patient reports having loose stool for 3x days, but denies having 

abdominal pain. Patient also complains of an open lesion to the right foot. 

Patient's daughter has been applying topical cream for 1x month, and lesion was 

improving, but recently started to worsen again.





PMD: Sherley Derrell-Sater








- Fall


Fall:Prior To Injury: denies: Other (chest pain, dizziness)





Past Medical History


Reviewed: Historical Data, Nursing Documentation, Vital Signs


Vital Signs: 





                                Last Vital Signs











Temp  99.1 F   18 12:33


 


Pulse  88   18 12:33


 


Resp  18   18 12:29


 


BP  132/71   18 12:33


 


Pulse Ox  99   18 12:33














- Medical History


PMH: Anemia, Bronchitis, CHF, Gall Bladder Disease (Cholelithiasis), HTN, H

ypercholesterolemia, Pneumonia


   Denies: Diabetes, HIV, Chronic Kidney Disease


Other PMH: scleroderma (crest syndrome)





- Family History


Family History: States: No Known Family Hx





- Living Arrangements


Living Arrangements: With Family





- Social History


Current smoker - smoking cessation education provided: No


Alcohol: None


Drugs: Denies





- Home Medications


Home Medications: 


                                Ambulatory Orders











 Medication  Instructions  Recorded


 


Pentoxifylline [Pentoxil] 400 mg PO TID #0 ter 09/28/15


 


Tramadol Hydrochloride [Tramadol] 50 mg PO DAILY #0 tab 09/28/15


 


Ciprofloxacin/Dexamethasone 75 drop AU BID #1 bottle 17





[Ciprodex Otic]  


 


Famotidine [Pepcid] 20 mg PO BID #60 tab 17


 


Lidocaine 5% [Lidoderm] 1 ea TD DAILY #5 patch 17


 


Oxymetazoline 0.05% [Afrin 0.05%] 30 spr NS Q12H #1 bottle 17


 


Rosuvastatin Calcium 5 mg PO DAILY #30 tablet 17


 


levoFLOXacin [Levaquin] 750 mg PO DAILY #10 tab 17


 


traMADol [Ultram] 50 mg PO TID PRN #20 tab 17


 


oxyCODONE/Acetaminophen [Percocet 1 ea PO Q6H PRN #15 tab 18





5/325 mg Tab]  














- Allergies


Allergies/Adverse Reactions: 


                                    Allergies











Allergy/AdvReac Type Severity Reaction Status Date / Time


 


cefadroxil Allergy  RASH Verified 17 20:40














Review of Systems


ROS Statement: Except As Marked, All Systems Reviewed And Found Negative


Gastrointestinal: Positive for: Diarrhea (loose stool).  Negative for: Abdominal

Pain


Musculoskeletal: Positive for: Leg Pain (left)


Skin: Positive for: Lesions (left ankle)





Physical Exam





- Reviewed


Nursing Documentation Reviewed: Yes


Vital Signs Reviewed: Yes





- Physical Exam


Appears: Positive for: Non-toxic, In Acute Distress (mild painful distress)


Head Exam: Positive for: ATRAUMATIC, NORMOCEPHALIC


Skin: Positive for: Normal Color, Warm, Dry


Eye Exam: Positive for: Normal appearance


ENT: Positive for: Normal ENT Inspection


Neck: Positive for: Normal


Cardiovascular/Chest: Positive for: Regular Rate, Rhythm


Respiratory: Positive for: Normal Breath Sounds.  Negative for: Accessory Muscle

Use, Respiratory Distress


Pulses-Dorsalis Pedis (L): 2+


Pulses-Dorsalis Pedis (R): 2+


Gastrointestinal/Abdominal: Positive for: Normal Exam, Soft.  Negative for: 

Tenderness


Extremity: Positive for: Normal ROM (good ROM of legs bilaterally), Calf 

Tenderness (patient complains of), Other (nodules over knuckles bilterally. Left

ankle: superficial healing wound without localized erythema.).  Negative for: 

Tenderness ((-) ankle tenderness)


Neurologic/Psych: Positive for: Alert, Oriented





- Laboratory Results


Result Diagrams: 


                                 18 13:20





                                 18 13:20





- ECG


O2 Sat by Pulse Oximetry: 99 (RA)


Pulse Ox Interpretation: Normal





- Radiology


X-Ray: Viewed By Me, Read By Radiologist (see MDM)





- CT Scan/US


  ** US left lower extremity vein


Other Rad Studies (CT/US): Read By Radiologist, Radiology Report Reviewed (see 

MDM note)





Medical Decision Making


Medical Decision Makin:28


Initial impression: 78 year old female with left leg pain status post fall.


Initial plan:


* US duplex lower extrm vein left


* EKG


* CMP


* CBC w/ diff


* blood culture


* urine culture


* urinalysis


* percocet 5/325 mg tab 1 tab PO


* reevaluation





14:05


US duplex lower extrm vein left read an reviewed by radiologist.


IMPRESSION:


No sonographic or Doppler evidence for DVT in left lower extremity. 





Enlarged 2.0 cm left inguinal lymph node.  Clinical follow-up is advised.





14:09


XRay of hip left (hip min 2 v w/ pelvis left) and XRay of tibia fibula left 

ordered.





15:28


XRay of tibia fibula left read and reviewed by radiologist


IMPRESSION:


No acute fracture or dislocation.





15:29


XRay of hip left (hip min 2 v w/ pelvis left) read and reviewed by radiologist


IMPRESSION:


No acute displaced fracture or dislocation. Please note occult fractures cannot 

be excluded on plain radiographs.  If there is a persistent clinical concern, an

MRI of the hip may be performed for further evaluation.





------------------------

------------------------------------------------------------------------- 


Scribe Attestation:


Documented by Amanda Leigh, acting as a scribe for Belen Willoughby PA-C.





Provider Scribe Attestation:





All medical record entries made by the Linusibe were at my direction and 

personally dictated by me. I have reviewed the chart and agree that the record 

accurately reflects my personal performance of the history, physical exam, 

medical decision making, and the department course for this patient. I have also

personally directed, reviewed, and agree with the discharge instructions and 

disposition.





Disposition





- Clinical Impression


Clinical Impression: 


 Leg pain, Fall








- Patient ED Disposition


Is Patient to be Admitted: No


Counseled Patient/Family Regarding: Diagnosis, Need For Followup





- Disposition


Disposition: Routine/Home


Disposition Time: 16:34


Condition: GOOD


Prescriptions: 


oxyCODONE/Acetaminophen [Percocet 5/325 mg Tab] 1 ea PO Q6H PRN #15 tab


 PRN Reason: Pain, Severe (8-10)


Instructions:  Preventing Falls


Forms:  [facility] (English)

## 2018-12-17 NOTE — CARD
--------------- APPROVED REPORT --------------





Date of service: 12/17/2018



EKG Measurement

Heart [id number]

[id number]

[id number]

[id number]

[id number]



<Conclusion>

Normal sinus rhythm

Possible Left atrial enlargement

Borderline ECG

## 2018-12-17 NOTE — RAD
PROCEDURE:  Left Hip X-ray Radiographs.



HISTORY:

 pain, fall 



COMPARISON:

None.



FINDINGS:



BONES:

There is diffuse bone demineralization.  No acute displaced fracture 

or bone destruction.  Bone alignment is normal. 



JOINTS:

Normal. 



SOFT TISSUES:

Normal. 



OTHER FINDINGS:

None.



IMPRESSION:

No acute displaced fracture or dislocation. Please note occult 

fractures cannot be excluded on plain radiographs.  If there is a 

persistent clinical concern, an MRI of the hip may be performed for 

further evaluation.

## 2018-12-17 NOTE — RAD
Date of service: 



12/17/2018



PROCEDURE:  Radiographs of the left tibia and fibula. 



HISTORY:

Pain, fall



COMPARISON:

None available.



TECHNIQUE:

Frontal and lateral views obtained. 



FINDINGS:



BONES:

There is diffuse bone demineralization.  No acute displaced fracture 

or bone destruction.  Bone alignment is normal



JOINT SPACES:

The joint spaces are preserved.



OTHER FINDINGS:

None.



IMPRESSION:

No acute fracture or dislocation.